# Patient Record
Sex: MALE | Race: WHITE | NOT HISPANIC OR LATINO | Employment: OTHER | URBAN - METROPOLITAN AREA
[De-identification: names, ages, dates, MRNs, and addresses within clinical notes are randomized per-mention and may not be internally consistent; named-entity substitution may affect disease eponyms.]

---

## 2019-06-10 ENCOUNTER — TELEPHONE (OUTPATIENT)
Dept: INTERNAL MEDICINE | Facility: CLINIC | Age: 64
End: 2019-06-10

## 2019-06-10 NOTE — TELEPHONE ENCOUNTER
----- Message from Estela Hughes sent at 6/10/2019 11:09 AM CDT -----  Contact: self  Pt has new pt appt scheduled for tomorrow, 06/11. Pt would like to have lab work done and would like to know if he needs to fast. Please call pt back at [phone].      Thanks,   Estela Hughes

## 2019-06-11 ENCOUNTER — HOSPITAL ENCOUNTER (OUTPATIENT)
Dept: RADIOLOGY | Facility: HOSPITAL | Age: 64
Discharge: HOME | End: 2019-06-11
Attending: FAMILY MEDICINE
Payer: MEDICARE

## 2019-06-11 ENCOUNTER — OFFICE VISIT (OUTPATIENT)
Dept: INTERNAL MEDICINE | Facility: CLINIC | Age: 64
End: 2019-06-11
Payer: MEDICARE

## 2019-06-11 VITALS
SYSTOLIC BLOOD PRESSURE: 114 MMHG | WEIGHT: 143.06 LBS | DIASTOLIC BLOOD PRESSURE: 66 MMHG | HEIGHT: 65 IN | BODY MASS INDEX: 23.83 KG/M2 | TEMPERATURE: 97 F | HEART RATE: 57 BPM | OXYGEN SATURATION: 98 %

## 2019-06-11 DIAGNOSIS — A60.00 HERPES SIMPLEX INFECTION OF GENITOURINARY SYSTEM: ICD-10-CM

## 2019-06-11 DIAGNOSIS — J41.0 SIMPLE CHRONIC BRONCHITIS: ICD-10-CM

## 2019-06-11 DIAGNOSIS — J41.0 SIMPLE CHRONIC BRONCHITIS: Primary | Chronic | ICD-10-CM

## 2019-06-11 DIAGNOSIS — L30.9 DERMATITIS: Chronic | ICD-10-CM

## 2019-06-11 DIAGNOSIS — Z11.4 SCREENING FOR HIV WITHOUT PRESENCE OF RISK FACTORS: ICD-10-CM

## 2019-06-11 DIAGNOSIS — Z11.3 ROUTINE SCREENING FOR STI (SEXUALLY TRANSMITTED INFECTION): ICD-10-CM

## 2019-06-11 DIAGNOSIS — R07.89 OTHER CHEST PAIN: ICD-10-CM

## 2019-06-11 DIAGNOSIS — Z13.1 SCREENING FOR DIABETES MELLITUS: ICD-10-CM

## 2019-06-11 DIAGNOSIS — R21 RASH AND OTHER NONSPECIFIC SKIN ERUPTION: ICD-10-CM

## 2019-06-11 DIAGNOSIS — Z12.5 SCREENING PSA (PROSTATE SPECIFIC ANTIGEN): ICD-10-CM

## 2019-06-11 DIAGNOSIS — Z13.220 SCREENING FOR LIPID DISORDERS: ICD-10-CM

## 2019-06-11 PROCEDURE — 3008F PR BODY MASS INDEX (BMI) DOCUMENTED: ICD-10-PCS | Mod: CPTII,S$GLB,, | Performed by: FAMILY MEDICINE

## 2019-06-11 PROCEDURE — 99999 PR PBB SHADOW E&M-EST. PATIENT-LVL IV: CPT | Mod: PBBFAC,,, | Performed by: FAMILY MEDICINE

## 2019-06-11 PROCEDURE — 99203 OFFICE O/P NEW LOW 30 MIN: CPT | Mod: S$GLB,,, | Performed by: FAMILY MEDICINE

## 2019-06-11 PROCEDURE — 99499 UNLISTED E&M SERVICE: CPT | Mod: S$GLB,,, | Performed by: FAMILY MEDICINE

## 2019-06-11 PROCEDURE — 71046 XR CHEST PA AND LATERAL: ICD-10-PCS | Mod: 26,,, | Performed by: RADIOLOGY

## 2019-06-11 PROCEDURE — 71046 X-RAY EXAM CHEST 2 VIEWS: CPT | Mod: TC

## 2019-06-11 PROCEDURE — 71046 X-RAY EXAM CHEST 2 VIEWS: CPT | Mod: 26,,, | Performed by: RADIOLOGY

## 2019-06-11 PROCEDURE — 1101F PR PT FALLS ASSESS DOC 0-1 FALLS W/OUT INJ PAST YR: ICD-10-PCS | Mod: CPTII,S$GLB,, | Performed by: FAMILY MEDICINE

## 2019-06-11 PROCEDURE — 99999 PR PBB SHADOW E&M-EST. PATIENT-LVL IV: ICD-10-PCS | Mod: PBBFAC,,, | Performed by: FAMILY MEDICINE

## 2019-06-11 PROCEDURE — 99499 RISK ADDL DX/OHS AUDIT: ICD-10-PCS | Mod: S$GLB,,, | Performed by: FAMILY MEDICINE

## 2019-06-11 PROCEDURE — 3008F BODY MASS INDEX DOCD: CPT | Mod: CPTII,S$GLB,, | Performed by: FAMILY MEDICINE

## 2019-06-11 PROCEDURE — 99203 PR OFFICE/OUTPT VISIT, NEW, LEVL III, 30-44 MIN: ICD-10-PCS | Mod: S$GLB,,, | Performed by: FAMILY MEDICINE

## 2019-06-11 PROCEDURE — 1101F PT FALLS ASSESS-DOCD LE1/YR: CPT | Mod: CPTII,S$GLB,, | Performed by: FAMILY MEDICINE

## 2019-06-11 RX ORDER — ACYCLOVIR 400 MG/1
400 TABLET ORAL 2 TIMES DAILY
Qty: 180 TABLET | Refills: 3 | Status: SHIPPED | OUTPATIENT
Start: 2019-06-11 | End: 2020-09-04 | Stop reason: SDUPTHER

## 2019-06-11 RX ORDER — ACYCLOVIR 400 MG/1
TABLET ORAL
COMMUNITY
Start: 2019-05-02 | End: 2019-06-11 | Stop reason: SDUPTHER

## 2019-06-11 NOTE — PROGRESS NOTES
CHIEF COMPLAINT  Establish Care and Cough    This is my first time treating him here. All problems addressed today are NEW TO ME.  He is requesting that I take over as his primary care provider.     HISTORY, ASSESSMENT, and PLAN    PROBLEM/CONDITION: Simple chronic bronchitis        ASSESSMENT:  He describes chronic recurrent productive cough for greater than a year.    PROBLEM/CONDITION: Herpes simplex infection of genitourinary system        ASSESSMENT:  Suppressed with antiviral therapy.  He request refill.    PROBLEM/CONDITION: Dermatitis of scalp        ASSESSMENT:  He has chronic pruritic scaly rash of scalp, which is suggestive of seborrheic dermatitis.  However, he feels that it is psoriasis.  It was agreed that he would see Dermatology for second opinion and further evaluation and treatment.    PROBLEM/CONDITION: Other chest pain         ASSESSMENT:  He reports occasional chest pain with cough, but no exertional chest pain or dyspnea on exertion.    PROBLEM/CONDITION: Screening for lipid disorders        ASSESSMENT: Ordered.    PROBLEM/CONDITION: Screening for diabetes mellitus        ASSESSMENT: Ordered.    PROBLEM/CONDITION: Screening PSA (prostate specific antigen)        ASSESSMENT: Ordered.    PROBLEM/CONDITION: Screening for HIV without presence of risk factors        ASSESSMENT: Ordered.    PROBLEM/CONDITION: Routine screening for STI (sexually transmitted infection)        ASSESSMENT: Ordered.      Orders Placed This Encounter    C. trachomatis/N. gonorrhoeae by AMP DNA    X-Ray Chest PA And Lateral    Lipid panel    PSA, Screening    Glucose, random    HIV 1/2 Ag/Ab (4th Gen)    RPR    Ambulatory Referral to Dermatology    Ambulatory referral to Pulmonology    acyclovir (ZOVIRAX) 400 MG tablet       Problem List Items Addressed This Visit        Derm    Dermatitis of scalp (Chronic)    Relevant Orders    Ambulatory Referral to Dermatology       Pulmonary    Simple chronic bronchitis -  Primary (Chronic)    Current Assessment & Plan     Sometimes pain with cough.         Relevant Orders    Ambulatory referral to Pulmonology    X-Ray Chest PA And Lateral (Completed)       ID    Herpes simplex infection of genitourinary system    Current Assessment & Plan     Well controlled on acyclovir 400mg BID.         Relevant Medications    acyclovir (ZOVIRAX) 400 MG tablet      Other Visit Diagnoses     Screening for lipid disorders        Relevant Orders    Lipid panel (Completed)    Screening for diabetes mellitus        Relevant Orders    Glucose, random (Completed)    Screening PSA (prostate specific antigen)        Relevant Orders    PSA, Screening (Completed)    Screening for HIV without presence of risk factors        Relevant Orders    HIV 1/2 Ag/Ab (4th Gen) (Completed)    Routine screening for STI (sexually transmitted infection)        Relevant Orders    Lipid panel (Completed)    PSA, Screening (Completed)    Glucose, random (Completed)    HIV 1/2 Ag/Ab (4th Gen) (Completed)    RPR (Completed)    C. trachomatis/N. gonorrhoeae by AMP DNA (Completed)    Other chest pain         Relevant Orders    X-Ray Chest PA And Lateral (Completed)    Rash and other nonspecific skin eruption         Relevant Orders    C. trachomatis/N. gonorrhoeae by AMP DNA (Completed)           Outpatient Medications Prior to Visit   Medication Sig Dispense Refill    acyclovir (ZOVIRAX) 400 MG tablet        No facility-administered medications prior to visit.         Medications Ordered This Encounter   Medications    acyclovir (ZOVIRAX) 400 MG tablet     Sig: Take 1 tablet (400 mg total) by mouth 2 (two) times daily.     Dispense:  180 tablet     Refill:  3       Medications Discontinued During This Encounter   Medication Reason    acyclovir (ZOVIRAX) 400 MG tablet Reorder        Follow up in about 2 months (around 8/11/2019) for re-evaluate problem(s) discussed today.    REVIEW OF SYSTEMS  CONSTITUTIONAL: No fever  "reported.  CARDIOVASCULAR: No angina reported.  PULMONARY: No hemoptysis reported.  PSYCHIATRIC: No mood instability reported.  NEUROLOGIC: No seizures reported.  ENDOCRINE: No polydipsia reported.  GASTROINTESTINAL: No blood in stool reported.  GENITOURINARY: No hematuria reported.  ENT: No acute hearing changes reported.  OPHTHALMOLOGIC: No acute vision changes reported.  HEMATOLOGIC: No bleeding problems reported.  MUSCULOSKELETAL: No recent injuries reported.  DERMATOLOGIC: No skin infection reported.  REMAINDER OF COMPLETE REVIEW OF SYSTEMS is negative or noncontributory to present illness except as noted herein.     PHYSICAL EXAM  Vitals:    06/11/19 0933   BP: 114/66   BP Location: Right arm   Patient Position: Sitting   Pulse: (!) 57   Temp: 97.2 °F (36.2 °C)   TempSrc: Tympanic   SpO2: 98%   Weight: 64.9 kg (143 lb 1.3 oz)   Height: 5' 5" (1.651 m)     CONSTITUTIONAL: Vital signs noted. No apparent distress. Does not appear acutely ill or septic. Appears adequately hydrated.  EYE: Sclerae anicteric. Lids and conjunctiva unremarkable.  ENT: External ENT unremarkable. Oropharynx moist.  NECK: Trachea midline. Thyroid nontender.  PULM: Lungs clear. Breathing unlabored.  CV: Auscultation reveals regular rate and rhythm without murmur, gallop or rub. No carotid bruit.  GI: Soft and nontender. Bowel sounds normal.  DERM: Skin warm and moist with normal turgor.  NEURO: There are no gross focal motor deficits or gross deficits of cranial nerves III-XII.  PSYCH: Alert and oriented x 3. Mood is grossly neutral. Affect appropriate. Judgment and insight not grossly compromised.  MSK: Grossly normal stance and gait.     PAST MEDICAL HISTORY  He has a past medical history of Allergy, Dermatitis of scalp, Herpes simplex, and Simple chronic bronchitis.    SURGICAL HISTORY  He has a past surgical history that includes Tonsillectomy.    FAMILY HISTORY  His family history includes Alzheimer's disease in his mother; Arthritis " "in his father; Cancer in his mother and sister; Diabetes in his father; Heart disease in his father; Stroke in his father.     ALLERGIES  Review of patient's allergies indicates:   Allergen Reactions    Bee sting [allergen ext-venom-honey bee]     Clindamycin hcl Other (See Comments)    Penicillin g potassium Other (See Comments)    Tetracyclines Other (See Comments)       SOCIAL HISTORY   TOBACCO USE HISTORY: He reports that he has never smoked. He has never used smokeless tobacco.   ALCOHOL USE HISTORY:  He reports that he drank alcohol.   RECREATIONAL DRUG USE HISTORY:  He reports that he does not use drugs.    Documentation entered by me for this encounter may have been done in part using speech-recognition technology. Although I have made an effort to ensure accuracy, "sound like" errors may exist and should be interpreted in context. -MARISELA Marie MD.    "

## 2019-06-12 ENCOUNTER — TELEPHONE (OUTPATIENT)
Dept: FAMILY MEDICINE | Facility: CLINIC | Age: 64
End: 2019-06-12

## 2019-06-12 NOTE — TELEPHONE ENCOUNTER
----- Message from Xi Fitzgerald sent at 6/12/2019 11:45 AM CDT -----  Contact: pt  Type:  Patient Returning Call    Who Called:Patient  Who Left Message for Patient:Yue  Does the patient know what this is regarding? Test results  Would the patient rather a call back or a response via [facility]? Call back  Best Call Back Number:[phone]  Additional Information: please send results to pt e-mail address

## 2019-06-18 ENCOUNTER — PATIENT OUTREACH (OUTPATIENT)
Dept: ADMINISTRATIVE | Facility: HOSPITAL | Age: 64
End: 2019-06-18

## 2019-06-18 NOTE — PROGRESS NOTES
Contacted patient to follow up on overdue fit kit. Left message requesting a call back.     Patient states he didn't receive one at his visit, but he would like one to be mailed to him    FitKit was given to patient on 6/20/2019 2:59 PM

## 2019-06-20 ENCOUNTER — PATIENT OUTREACH (OUTPATIENT)
Dept: ADMINISTRATIVE | Facility: HOSPITAL | Age: 64
End: 2019-06-20

## 2019-06-20 DIAGNOSIS — Z12.11 SCREEN FOR COLON CANCER: Primary | ICD-10-CM

## 2019-06-20 NOTE — PROGRESS NOTES
Health maintenance reviewed. Immunizations reviewed and reconciled.  Fitkit ordered WOG to facilitate Fitkit to be mailed by ROSA Vanegas.

## 2019-06-25 ENCOUNTER — TELEPHONE (OUTPATIENT)
Dept: INTERNAL MEDICINE | Facility: CLINIC | Age: 64
End: 2019-06-25

## 2019-06-25 NOTE — TELEPHONE ENCOUNTER
----- Message from Gerda Gimenez sent at 6/25/2019 10:56 AM CDT -----  Contact: Pt  Please give pt a call at .[phone] (home) regarding some severe sinus hussain he is having. Pt needs to have something called in for his sinus issues he doesn't see the pulmonologist until August.       ..  [facility] [employer] [facility] - [place] - [address] AT [facility]  [address] [address]  Phone: [phone] Fax: [phone]

## 2019-06-25 NOTE — TELEPHONE ENCOUNTER
Spoke with patient and he stated that he is still having the post nasal drip and cough along with sinus pressure that he spoke to Dr. Marie about on 6/11/19. He is now requesting something to clear up the mucous.

## 2019-06-27 ENCOUNTER — TELEPHONE (OUTPATIENT)
Dept: INTERNAL MEDICINE | Facility: CLINIC | Age: 64
End: 2019-06-27

## 2019-06-27 NOTE — TELEPHONE ENCOUNTER
Called and left a voicemail for patient that I have not gotten a response from Dr. Marie yet and am still waiting.

## 2019-06-27 NOTE — TELEPHONE ENCOUNTER
----- Message from Keegan Barone sent at 6/27/2019  9:39 AM CDT -----  Contact: [phone]  ..Type:  Patient Returning Call    Who Called:Chuy  Who Left Message for Patient:Rick  Does the patient know what this is regarding?:missed call 6/25  Would the patient rather a call back or a response via [facility]? Call back   Best Call Back Number:[phone]  Additional Information:

## 2019-07-01 ENCOUNTER — APPOINTMENT (OUTPATIENT)
Dept: LAB | Facility: HOSPITAL | Age: 64
End: 2019-07-01
Attending: FAMILY MEDICINE
Payer: MEDICARE

## 2019-07-21 NOTE — TELEPHONE ENCOUNTER
Will discuss further at next appointment.  Future Appointments   Date Time Provider Department Center   8/5/2019  8:40 AM Wil [facility]urn MD [facility] [place]   8/12/2019  8:20 AM MARISELA Marie MD [facility] [place]

## 2019-07-29 ENCOUNTER — TELEPHONE (OUTPATIENT)
Dept: INTERNAL MEDICINE | Facility: CLINIC | Age: 64
End: 2019-07-29

## 2019-07-29 NOTE — TELEPHONE ENCOUNTER
----- Message from Sandy Ross sent at 7/29/2019 12:49 PM CDT -----  Contact: self [phone]  Type:  Test Results    Who Called: Chuy Bucio  Name of Test (Lab/Mammo/Etc): Stool  Date of Test: 07/01/19  Ordering Provider: Frank  Where the test was performed: [facility]  Would the patient rather a call back or a response via [facility]? Call back   Best Call Back Number: [phone]  Additional Information:

## 2019-07-29 NOTE — TELEPHONE ENCOUNTER
Called and left a voicemail for patient to return my call regarding his questions about  lab tests.

## 2019-08-01 ENCOUNTER — TELEPHONE (OUTPATIENT)
Dept: PULMONOLOGY | Facility: CLINIC | Age: 64
End: 2019-08-01

## 2019-08-01 DIAGNOSIS — R06.02 SOB (SHORTNESS OF BREATH): Primary | ICD-10-CM

## 2019-08-05 ENCOUNTER — CLINICAL SUPPORT (OUTPATIENT)
Dept: PULMONOLOGY | Facility: CLINIC | Age: 64
End: 2019-08-05
Payer: MEDICARE

## 2019-08-05 ENCOUNTER — PATIENT OUTREACH (OUTPATIENT)
Dept: ADMINISTRATIVE | Facility: HOSPITAL | Age: 64
End: 2019-08-05

## 2019-08-05 ENCOUNTER — OFFICE VISIT (OUTPATIENT)
Dept: PULMONOLOGY | Facility: CLINIC | Age: 64
End: 2019-08-05
Payer: MEDICARE

## 2019-08-05 ENCOUNTER — HOSPITAL ENCOUNTER (OUTPATIENT)
Dept: RADIOLOGY | Facility: HOSPITAL | Age: 64
Discharge: HOME | End: 2019-08-05
Attending: INTERNAL MEDICINE
Payer: MEDICARE

## 2019-08-05 VITALS
RESPIRATION RATE: 16 BRPM | BODY MASS INDEX: 23.88 KG/M2 | HEART RATE: 67 BPM | DIASTOLIC BLOOD PRESSURE: 68 MMHG | WEIGHT: 143.31 LBS | OXYGEN SATURATION: 99 % | SYSTOLIC BLOOD PRESSURE: 110 MMHG | HEIGHT: 65 IN

## 2019-08-05 DIAGNOSIS — R05.8 UPPER AIRWAY COUGH SYNDROME: Primary | ICD-10-CM

## 2019-08-05 DIAGNOSIS — R05.8 UPPER AIRWAY COUGH SYNDROME: ICD-10-CM

## 2019-08-05 DIAGNOSIS — Z11.59 NEED FOR HEPATITIS C SCREENING TEST: Primary | ICD-10-CM

## 2019-08-05 DIAGNOSIS — R05.3 CHRONIC COUGH: ICD-10-CM

## 2019-08-05 LAB
BRPFT: NORMAL
FEF 25 75 CHG: 17.1 %
FEF 25 75 LLN: 1.08
FEF 25 75 POST REF: 91.4 %
FEF 25 75 PRE REF: 78.1 %
FEF 25 75 REF: 2.35
FET100 CHG: 8.8 %
FEV1 CHG: 7.1 %
FEV1 FVC CHG: 2.1 %
FEV1 FVC LLN: 65
FEV1 FVC POST REF: 90.3 %
FEV1 FVC PRE REF: 88.4 %
FEV1 FVC REF: 78
FEV1 LLN: 2.11
FEV1 POST REF: 107.9 %
FEV1 PRE REF: 100.7 %
FEV1 REF: 2.86
FVC CHG: 4.9 %
FVC LLN: 2.77
FVC POST REF: 119.4 %
FVC PRE REF: 113.9 %
FVC REF: 3.69
PEF CHG: 22.1 %
PEF LLN: 5.73
PEF POST REF: 93.8 %
PEF PRE REF: 76.8 %
PEF REF: 7.73
POST FEF 25 75: 2.15 L/S (ref 1.08–3.62)
POST FET 100: 11.51 SEC
POST FEV1 FVC: 70.01 % (ref 64.6–90.46)
POST FEV1: 3.08 L (ref 2.11–3.61)
POST FVC: 4.41 L (ref 2.77–4.61)
POST PEF: 7.25 L/S (ref 5.73–9.72)
PRE FEF 25 75: 1.83 L/S (ref 1.08–3.62)
PRE FET 100: 10.58 SEC
PRE FEV1 FVC: 68.56 % (ref 64.6–90.46)
PRE FEV1: 2.88 L (ref 2.11–3.61)
PRE FVC: 4.2 L (ref 2.77–4.61)
PRE PEF: 5.93 L/S (ref 5.73–9.72)

## 2019-08-05 PROCEDURE — 94060 EVALUATION OF WHEEZING: CPT | Mod: S$GLB,,, | Performed by: INTERNAL MEDICINE

## 2019-08-05 PROCEDURE — 70220 X-RAY EXAM OF SINUSES: CPT | Mod: TC

## 2019-08-05 PROCEDURE — 94060 PR EVAL OF BRONCHOSPASM: ICD-10-PCS | Mod: S$GLB,,, | Performed by: INTERNAL MEDICINE

## 2019-08-05 PROCEDURE — 3008F PR BODY MASS INDEX (BMI) DOCUMENTED: ICD-10-PCS | Mod: CPTII,S$GLB,, | Performed by: INTERNAL MEDICINE

## 2019-08-05 PROCEDURE — 99999 PR PBB SHADOW E&M-EST. PATIENT-LVL III: ICD-10-PCS | Mod: PBBFAC,,, | Performed by: INTERNAL MEDICINE

## 2019-08-05 PROCEDURE — 99999 PR PBB SHADOW E&M-EST. PATIENT-LVL III: CPT | Mod: PBBFAC,,, | Performed by: INTERNAL MEDICINE

## 2019-08-05 PROCEDURE — 99214 OFFICE O/P EST MOD 30 MIN: CPT | Mod: 25,S$GLB,, | Performed by: INTERNAL MEDICINE

## 2019-08-05 PROCEDURE — 3008F BODY MASS INDEX DOCD: CPT | Mod: CPTII,S$GLB,, | Performed by: INTERNAL MEDICINE

## 2019-08-05 PROCEDURE — 99214 PR OFFICE/OUTPT VISIT, EST, LEVL IV, 30-39 MIN: ICD-10-PCS | Mod: 25,S$GLB,, | Performed by: INTERNAL MEDICINE

## 2019-08-05 PROCEDURE — 1101F PR PT FALLS ASSESS DOC 0-1 FALLS W/OUT INJ PAST YR: ICD-10-PCS | Mod: CPTII,S$GLB,, | Performed by: INTERNAL MEDICINE

## 2019-08-05 PROCEDURE — 70220 XR SINUSES MIN 3 VIEWS: ICD-10-PCS | Mod: 26,,, | Performed by: RADIOLOGY

## 2019-08-05 PROCEDURE — 70220 X-RAY EXAM OF SINUSES: CPT | Mod: 26,,, | Performed by: RADIOLOGY

## 2019-08-05 PROCEDURE — 1101F PT FALLS ASSESS-DOCD LE1/YR: CPT | Mod: CPTII,S$GLB,, | Performed by: INTERNAL MEDICINE

## 2019-08-05 RX ORDER — FLUTICASONE PROPIONATE 50 MCG
1 SPRAY, SUSPENSION (ML) NASAL DAILY
Qty: 1 BOTTLE | Refills: 0 | Status: SHIPPED | OUTPATIENT
Start: 2019-08-05 | End: 2019-10-24

## 2019-08-05 NOTE — PROGRESS NOTES
Subjective:       Patient ID: Chuy Bucio is a 63 y.o. male.  Patient Active Problem List   Diagnosis    Herpes simplex infection of genitourinary system    Dermatitis of scalp    Simple chronic bronchitis       Chief Complaint:  Chuy Bucio 63 y.o.  Referred to me by Dr. 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  [facility] severity index: 5 ( LOW/ NORMAL)  Recently established care with Dr. Marie.  Has had intermittent cough that occurs when he is  exposed to cold air.  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  Has had recurrent bronchitis was seen in June.  He has been treated with Z-Angel.  Years ago he saw Dr. Del Real for similar symptoms and an inhaler was given which he used for 3 weeks on the not feel better.  He denies any past no history of asthma.  Occasional Sinus drip, never seen ENT  Never smoker  No hemoptysis no wheezing.  He feels that he has a chronic congestion and pressure at the back of his down arm.  No other like occupational exposures.  Some of his symptoms seem to be   exacerbated once when he is in extreme exercise like bike riding.    Occupational History:  denies occupational exposure to asbestos, silica and petrochemicals.    Avocational Exposures:  pollens    Pet Exposures:  none      The following portions of the patient's history were reviewed and updated as appropriate:   He  has a past medical history of Allergy, Dermatitis of scalp (6/11/2019), Herpes simplex, and Simple chronic bronchitis (6/11/2019).  He does not have any pertinent problems on file.  He  has a past surgical history that includes Tonsillectomy.  His family history includes Alzheimer's disease in his mother; Arthritis in his father; Cancer in his mother and sister; Diabetes in his father; Heart disease in his father; Stroke in his father.  He  reports that he has never smoked. He has never used  "smokeless tobacco. He reports that he drank alcohol. He reports that he does not use drugs.  He has a current medication list which includes the following prescription(s): acyclovir and fluticasone propionate.  Current Outpatient Medications on File Prior to Visit   Medication Sig Dispense Refill    acyclovir (ZOVIRAX) 400 MG tablet Take 1 tablet (400 mg total) by mouth 2 (two) times daily. 180 tablet 3     No current facility-administered medications on file prior to visit.      He is allergic to bee sting [allergen ext-venom-honey bee]; clindamycin hcl; penicillin g potassium; and tetracyclines..    Review of Systems  A comprehensive review of systems was negative except for: Respiratory: positive for cough    Denies wheezing denies hemoptysis denies night sweats denies weight loss denies occupational exposures to asbestos mold     Objective:     Vitals:    08/05/19 0845   BP: 110/68   Pulse: 67   Resp: 16   SpO2: 99%   Weight: 65 kg (143 lb 4.8 oz)   Height: 5' 5" (1.651 m)     Physical Exam   Constitutional: He is oriented to person, place, and time. He appears well-developed and well-nourished.   HENT:   Head: Normocephalic and atraumatic.   Nose: Mucosal edema and rhinorrhea present.   Eyes: Pupils are equal, round, and reactive to light. Conjunctivae and EOM are normal. Left eye exhibits no discharge. No scleral icterus.   Neck: Normal range of motion. Neck supple.   Cardiovascular: Normal rate, regular rhythm and normal heart sounds.   Pulmonary/Chest: Effort normal and breath sounds normal. No stridor. No respiratory distress. He has no wheezes. He has no rales.   Abdominal: Soft. Bowel sounds are normal. He exhibits no distension.   Musculoskeletal: Normal range of motion. He exhibits no edema or deformity.   Neurological: He is alert and oriented to person, place, and time. No cranial nerve deficit.   Skin: Skin is warm and dry. Capillary refill takes 2 to 3 seconds.   Psychiatric: He has a normal mood " and affect.   Nursing note and vitals reviewed.        Data Review:   CBC: No results found for: WBC, RBC, HGB, HCT, PLT  BMP:   Lab Results   Component Value Date    GLU 90 06/11/2019     Radiology review:   Diagnostics: Chest x-ray:  Chest x-ray reviewed with patient shows hyperinflation       Lab Results   Component Value Date    PREFVC 4.20 08/05/2019    NRFVQT3889 1.83 08/05/2019    PREPEF 5.93 08/05/2019    ASIPSF563 10.58 08/05/2019    [id number] 68.56 08/05/2019    POSTFVC 4.41 08/05/2019    POSTFEV1 3.08 08/05/2019    [id number] 2.15 08/05/2019    POSTPEF 7.25 08/05/2019    [id number] 11.51 08/05/2019    OMNGGQC2FWV 70.01 08/05/2019       Flow volume loops are normal.  Normal spirometry. (FEV1/VC greater than or equal to LLN and FVC greater than or equal to LLN)  Normal airflow. (FEV1/VC greater than or equal to LLN)  (Physician Final: 08/05/2019 12:23PM, Electronically signed by Dr. TANA Coburn)    Assessment:      Problem List Items Addressed This Visit     None      Visit Diagnoses     Upper airway cough syndrome    -  Primary    Relevant Medications    fluticasone propionate (FLONASE) 50 mcg/actuation nasal spray    Other Relevant Orders    Spirometry with/without bronchodilator (Completed)    X-Ray Sinuses 3 or more views (Completed)    CBC auto differential    Complete PFT without bronchodilator - Clinic    IGE    MAXIMAL INSPIRATORY/EXPIRATORY PRESSURE    Chronic cough             Plan:        Orders Placed This Encounter   Procedures    X-Ray Sinuses 3 or more views     Standing Status:   Future     Number of Occurrences:   1     Standing Expiration Date:   8/5/2020     Order Specific Question:   May the Radiologist modify the order per protocol to meet the clinical needs of the patient?     Answer:   Yes    CBC auto differential     Standing Status:   Future     Number of Occurrences:   1     Standing Expiration Date:   10/3/2020    IGE     Standing Status:   Future     Number of Occurrences:    1     Standing Expiration Date:   10/3/2020    Spirometry with/without bronchodilator     Standing Status:   Future     Number of Occurrences:   1     Standing Expiration Date:   8/4/2020    Complete PFT without bronchodilator - Clinic     Standing Status:   Future     Standing Expiration Date:   8/5/2020    MAXIMAL INSPIRATORY/EXPIRATORY PRESSURE     Standing Status:   Future     Standing Expiration Date:   8/5/2020     Looks like a cough variant type asthma  Instruction on bronchodilator  If complete PFTs are normal then methacholine challenge test will be performed  Check at IgE    Follow up in about 4 weeks (around 9/2/2019), or Donovan today, Labs, Sinus films today, Flonase. PFT and MEP.MIP next visit, Cough severity index, for Sign up [facility].    This note was prepared using voice recognition system and is likely to have sound alike errors that may have been overlooked even after proof reading.  Please call me with any questions    Discussed diagnosis, its evaluation, treatment and usual course. All questions answered.    Thank you for the courtesy of participating in the care of this patient    Wil Coburn MD

## 2019-08-05 NOTE — PROGRESS NOTES
Health maintenance reviewed.  Care Everywhere checked. Immunizations reviewed and reconciled.  Hep C. Antibody ordered.

## 2019-08-12 ENCOUNTER — LAB VISIT (OUTPATIENT)
Dept: LAB | Facility: HOSPITAL | Age: 64
End: 2019-08-12
Payer: MEDICARE

## 2019-08-12 ENCOUNTER — OFFICE VISIT (OUTPATIENT)
Dept: INTERNAL MEDICINE | Facility: CLINIC | Age: 64
End: 2019-08-12
Payer: MEDICARE

## 2019-08-12 VITALS
DIASTOLIC BLOOD PRESSURE: 66 MMHG | HEIGHT: 65 IN | TEMPERATURE: 96 F | OXYGEN SATURATION: 98 % | BODY MASS INDEX: 23.91 KG/M2 | WEIGHT: 143.5 LBS | SYSTOLIC BLOOD PRESSURE: 108 MMHG | HEART RATE: 58 BPM

## 2019-08-12 DIAGNOSIS — J41.0 SIMPLE CHRONIC BRONCHITIS: Primary | Chronic | ICD-10-CM

## 2019-08-12 DIAGNOSIS — Z23 NEED FOR SHINGLES VACCINE: ICD-10-CM

## 2019-08-12 DIAGNOSIS — Z11.59 NEED FOR HEPATITIS C SCREENING TEST: ICD-10-CM

## 2019-08-12 DIAGNOSIS — Z23 NEED FOR VACCINATION WITH 13-POLYVALENT PNEUMOCOCCAL CONJUGATE VACCINE: ICD-10-CM

## 2019-08-12 PROCEDURE — 1101F PT FALLS ASSESS-DOCD LE1/YR: CPT | Mod: CPTII,S$GLB,, | Performed by: FAMILY MEDICINE

## 2019-08-12 PROCEDURE — 1101F PR PT FALLS ASSESS DOC 0-1 FALLS W/OUT INJ PAST YR: ICD-10-PCS | Mod: CPTII,S$GLB,, | Performed by: FAMILY MEDICINE

## 2019-08-12 PROCEDURE — 99213 OFFICE O/P EST LOW 20 MIN: CPT | Mod: 25,S$GLB,, | Performed by: FAMILY MEDICINE

## 2019-08-12 PROCEDURE — 99999 PR PBB SHADOW E&M-EST. PATIENT-LVL III: CPT | Mod: PBBFAC,,, | Performed by: FAMILY MEDICINE

## 2019-08-12 PROCEDURE — G0009 ADMIN PNEUMOCOCCAL VACCINE: HCPCS | Mod: 59,S$GLB,, | Performed by: FAMILY MEDICINE

## 2019-08-12 PROCEDURE — 90670 PCV13 VACCINE IM: CPT | Mod: S$GLB,,, | Performed by: FAMILY MEDICINE

## 2019-08-12 PROCEDURE — G0009 PNEUMOCOCCAL CONJUGATE VACCINE 13-VALENT LESS THAN 5YO & GREATER THAN: ICD-10-PCS | Mod: 59,S$GLB,, | Performed by: FAMILY MEDICINE

## 2019-08-12 PROCEDURE — 36415 COLL VENOUS BLD VENIPUNCTURE: CPT

## 2019-08-12 PROCEDURE — 99999 PR PBB SHADOW E&M-EST. PATIENT-LVL III: ICD-10-PCS | Mod: PBBFAC,,, | Performed by: FAMILY MEDICINE

## 2019-08-12 PROCEDURE — 3008F PR BODY MASS INDEX (BMI) DOCUMENTED: ICD-10-PCS | Mod: CPTII,S$GLB,, | Performed by: FAMILY MEDICINE

## 2019-08-12 PROCEDURE — 3008F BODY MASS INDEX DOCD: CPT | Mod: CPTII,S$GLB,, | Performed by: FAMILY MEDICINE

## 2019-08-12 PROCEDURE — 86803 HEPATITIS C AB TEST: CPT

## 2019-08-12 PROCEDURE — 90670 PNEUMOCOCCAL CONJUGATE VACCINE 13-VALENT LESS THAN 5YO & GREATER THAN: ICD-10-PCS | Mod: S$GLB,,, | Performed by: FAMILY MEDICINE

## 2019-08-12 PROCEDURE — 99213 PR OFFICE/OUTPT VISIT, EST, LEVL III, 20-29 MIN: ICD-10-PCS | Mod: 25,S$GLB,, | Performed by: FAMILY MEDICINE

## 2019-08-12 NOTE — PROGRESS NOTES
CHIEF COMPLAINT  Follow-up      HISTORY, ASSESSMENT, and PLAN    1.  Simple chronic bronchitis        ASSESSMENT:  Stable.  He saw pulmonologist in was prescribed Flonase.  He has been on this medicine about a week and can tell no significant difference.   He is to follow up with pulmonologist in about three weeks.    2.  Need for vaccination with 13-polyvalent pneumococcal conjugate vaccine        ASSESSMENT:  Ordered.    3.  Need for shingles vaccine       ASSESSMENT:  Ordered.      Orders Placed This Encounter    Pneumococcal Conjugate Vaccine (13 Valent) (IM)    varicella-zoster gE-AS01B, PF, (SHINGRIX) 50 mcg/0.5 mL injection       Problem List Items Addressed This Visit        Pulmonary    Simple chronic bronchitis - Primary (Chronic)      Other Visit Diagnoses     Need for vaccination with 13-polyvalent pneumococcal conjugate vaccine        Relevant Orders    Pneumococcal Conjugate Vaccine (13 Valent) (IM)    Need for shingles vaccine        Relevant Medications    varicella-zoster gE-AS01B, PF, (SHINGRIX) 50 mcg/0.5 mL injection           Outpatient Medications Prior to Visit   Medication Sig Dispense Refill    acyclovir (ZOVIRAX) 400 MG tablet Take 1 tablet (400 mg total) by mouth 2 (two) times daily. 180 tablet 3    fluticasone propionate (FLONASE) 50 mcg/actuation nasal spray 1 spray (50 mcg total) by Each Nostril route once daily. 1 Bottle 0     No facility-administered medications prior to visit.         Medications Ordered This Encounter   Medications    varicella-zoster gE-AS01B, PF, (SHINGRIX) 50 mcg/0.5 mL injection     Sig: Inject 0.5 mL (one dose) into muscle now; give second dose at least 2 months later     Dispense:  0.5 mL     Refill:  1       There are no discontinued medications.     Follow up in about 1 year (around 8/12/2020) for wellness and preventive services.    REVIEW OF SYSTEMS  PULMONARY: No hemoptysis or trouble breathing reported.     PHYSICAL EXAM  Vitals:    08/12/19 0827  "  BP: 108/66   BP Location: Right arm   Patient Position: Sitting   Pulse: (!) 58   Temp: 96 °F (35.6 °C)   TempSrc: Tympanic   SpO2: 98%   Weight: 65.1 kg (143 lb 8.3 oz)   Height: 5' 5" (1.651 m)     CONSTITUTIONAL: Vital signs noted. No apparent distress. Does not appear acutely ill or septic. Appears adequately hydrated.  ENT: Oropharynx moist.  PULM: Breathing unlabored.  CV: Heart regular.  DERM: Skin normothermic.  PSYCHIATRIC: Alert and conversant. Grossly oriented. Mood is grossly neutral. Affect appropriate. Judgment and insight not grossly compromised.     Documentation entered by me for this encounter may have been done in part using speech-recognition technology. Although I have made an effort to ensure accuracy, "sound like" errors may exist and should be interpreted in context. -MARISELA Marie MD.   "

## 2019-08-13 LAB — HCV AB SERPL QL IA: NEGATIVE

## 2019-08-31 NOTE — PROGRESS NOTES
"Chuy Doherty.      I'm happy to report that these test results are NORMAL or at least ACCEPTABLE.    Want to learn more about your test results and what they mean? It's as simple as 1, 2, 3.     (1) Log in to your [facility] account at [email or web address]     (2) From the "View test results" tab, click on the test you want to know more about.     (3) Click on the "About This Test" link.    We can discuss your test results further at your next appointment.  If you have any questions, be sure to write them down and bring them to your appointment.  If you have any urgent questions in the meantime, please send me a message using [facility], or you can call my office at [phone].    Thanks for letting me care for you, thanks for trusting us with your healthcare needs, and thanks for using [facility].    Sincerely,    MARISELA Marie MD"

## 2019-09-09 ENCOUNTER — TELEPHONE (OUTPATIENT)
Dept: PULMONOLOGY | Facility: HOSPITAL | Age: 64
End: 2019-09-09

## 2019-09-09 DIAGNOSIS — J32.9 SINOBRONCHITIS: ICD-10-CM

## 2019-09-09 DIAGNOSIS — J40 SINOBRONCHITIS: ICD-10-CM

## 2019-09-09 DIAGNOSIS — J41.0 SIMPLE CHRONIC BRONCHITIS: Primary | Chronic | ICD-10-CM

## 2019-09-09 NOTE — TELEPHONE ENCOUNTER
"----- Message from Yeny Thompson LPN sent at 9/9/2019 12:59 PM CDT -----  Patient's LV was 8/5/19 he was prescribed flonase. Patient states this is doing nothing for him, "I may as well be shooting water in my nose".  Patient is requesting something different. Please advise.   "

## 2019-09-13 NOTE — PROGRESS NOTES
"Referring Provider:    Wil Coburn Md  [address] [facility]  [place] [address]  Subjective:   Patient: Chuy Bucio [id number], :1955   Visit date:2019 11:40 AM    Chief Complaint:  Cough (dry x 4 months)    HPI:  Chuy is a 63 y.o. male who I was asked to see in consultation for evaluation of the following issue(s):    Patient first presented to clinic on 19 for evaluation of a dry, chronic cough x 4 mo. He was seen by pulmonologist, Dr. Coburn, w/u with CXR and sinus x-ray were insignificant. Pt reported "an imaging done by pulmonary returned for a pocket of air in my upper airway that is not supposed to be there" and believes he may have asthma. He has f/u with Dr. Coburn in October. Trial with Flonase was w/o any relief. He denied significant allergies. 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 He c/o these episodes with the cough and dysphonia multiple times throughout the year for the past several years, lasts 1-3 mo and "goes away on its own". The only trigger he has found with episode onset is cold air, such as Winter season or if he is sitting under a cold air conditioner. He has never smoked. Reported GERD symptoms only with spicy foods. No other relieving factors. Pt also c/o dizziness and tinnitus. No past or recent audiogram.     Dizziness/Vertigo    Onset:  2 month(s)  Total number of episodes:  occasional  Duration of individual episodes:minutes  Severity: mild  Exacerbating factors: turning head  Prior Medications: nothing  Relieving factors:  remaining motionless  Associated signs and symptoms:  Tinnitus  Quality:   Spinning- Yes   Light headedness- No   Sensation that room is moving but patient is motionless- Yes  Prior history of similar events: Yes    Tinnitus:    Patient presents with tinnitus. Onset of symptoms was gradual starting several years ago ago with unchanged course since that time. Patient describes " the tinnitus as fluctuating located in the bilateral ear. The quality is described as high pitch. The pattern is nonpulsatile with an intensity that is soft. Patient describes his level of annoyance as minimally annoying, intermittently aware. Associated symptoms include hearing loss and dizziness Previous treatments include none.      Review of Systems:  Negative unless checked off.  Gen:  []fever   []fatigue  HENT:  []nosebleeds  []dental problem   Eyes:  []photophobia  []visual disturbance  Resp:  []chest tightness []wheezing  Card:  []chest pain  []leg swelling  GI:  []abdominal pain []blood in stool  :  []dysuria  []hematuria  Musc:  []joint swelling  []gait problem  Skin:  []color change  []pallor  Neuro:  []seizures  []numbness  Hem:  []bruise/bleed easily  Psych:  []hallucinations  []behavioral problems  Allergy/Imm: is allergic to bee sting [allergen ext-venom-honey bee]; clindamycin hcl; penicillin g potassium; and tetracyclines.    His meds, allergies, medical, surgical, social & family histories were reviewed & updated:  -     He has a current medication list which includes the following prescription(s): acyclovir, fluticasone propionate, pantoprazole, and varicella-zoster ge-as01b (pf).  -     He  has a past medical history of Allergy, Dermatitis of scalp (6/11/2019), Herpes simplex, and Simple chronic bronchitis (6/11/2019).   -     He does not have any pertinent problems on file.   -     He  has a past surgical history that includes Tonsillectomy.  -     He  reports that he has never smoked. He has never used smokeless tobacco. He reports that he drank alcohol. He reports that he does not use drugs.  -     His family history includes Alzheimer's disease in his mother; Arthritis in his father; Cancer in his mother and sister; Diabetes in his father; Heart disease in his father; Stroke in his father.  -     He is allergic to bee sting [allergen ext-venom-honey bee]; clindamycin hcl; penicillin g  "potassium; and tetracyclines.    Objective:     Physical Exam:  Vitals:  /72 (BP Location: Right arm, Patient Position: Sitting, BP Method: Medium (Automatic))   Pulse 67   Temp 97.8 °F (36.6 °C) (Oral)   Resp 16   Ht 5' 5" (1.651 m)   Wt 65.6 kg (144 lb 10 oz)   BMI 24.07 kg/m²   General appearance:  Well developed, well nourished    Eyes:  Extraocular motions intact, PERRL    Communication:  no hoarseness, no dysphonia    Ears:  Otoscopy of external auditory canals and tympanic membranes was normal, clinical speech reception thresholds grossly intact, no mass/lesion of auricle.  Nose:  No masses/lesions of external nose, nasal mucosa, septum, and turbinates were within normal limits.  Mouth:  No mass/lesion of lips, teeth, gums, hard/soft palate, tongue, tonsils, or oropharynx. See endoscopic exam    Cardiovascular:  No pedal edema; Radial Pulses +2     Neck & Lymphatics:  No cervical lymphadenopathy, no neck mass/crepitus/ asymmetry, trachea is midline, no thyroid enlargement/tenderness/mass.    Psych: Oriented x3,  Alert with normal mood and affect.     Respiration/Chest:  Symmetric expansion during respiration, normal respiratory effort.    Skin:  Warm and intact. No ulcerations of face, scalp, neck.    XR SINUSES MIN 3 VIEWS    CLINICAL HISTORY:  Cough    TECHNIQUE:  AP, lateral, and Cervantes views of the paranasal sinuses were performed    COMPARISON:  None.    FINDINGS:  No fluid levels are noted.  No mucoperiosteal thickening identified.      Impression       No evidence of sinus opacification     XR CHEST PA AND LATERAL    CLINICAL HISTORY:  Simple chronic bronchitis    TECHNIQUE:  PA and lateral views of the chest were performed.    COMPARISON:  None    FINDINGS:  Cardiac silhouette and mediastinal contours are normal.  Lungs are clear.  Osseous structures are intact.      Impression       No acute cardiopulmonary process.     DH: Negative bilaterally    Assessment & Plan:   Chuy was seen " today for cough.    Diagnoses and all orders for this visit:    Chronic cough    Dizziness    Tinnitus of both ears    Dysphonia    Gastroesophageal reflux disease, esophagitis presence not specified    Other orders  -     pantoprazole (PROTONIX) 40 MG tablet; Take 1 tablet (40 mg total) by mouth once daily.    Laryngoscopic exam insignificant.   Trial with Protonix x 4 weeks  Advised keeping f/u with pulm for possible asthma  If dizzy episodes return, will schedule VNG. Advised audiogram, pt deferred at this time.    We discussed his medical conditions, treatments and plan.  Chuy should return to clinic if any issues arise (symptoms worsen or persist), otherwise we will see him back in the clinic only as needed.    Thank you for allowing me to participate in the care of Chuy.      Amie Duffy PA-C  [facility]   [facility]  [address]  [place] [address]  P: [phone]  F: [phone]      Patient: Chuy Bucio [id number], :1955  Procedure date:2019  Patient's medications, allergies, past medical, surgical, social and family histories were reviewed and updated as appropriate.  Chief Complaint:  Cough (dry x 4 months)    HPI:  Chuy is a 63 y.o. male with the history of present illness as discussed in the clinic note from today.    Procedure: Risks, benefits, and alternatives of the procedure were discussed with the patient, and the patient consented to the fiberoptic examination.  We applied a topical nasal decongestant and analgesic.  After adequate anesthesia was obtained, the flexible fiberoptic scope was passed into each nostril independently.  Each nasal cavity, the entire pharynx (nasopharynx to hypopharynx) and the larynx were visualized. At the end of the examination, the scope was removed. The patient tolerated the procedure well with no complications.     Findings:  -     Laryngeal mucosa is normal  -     Posterior commissure has  mild hypertrophy  -     Lingual tonsils have no hypertrophy  -     Adenoids have no  hypertrophy  -     Right vocal fold: normal mobility     mass/lesion: none  -     Left vocal fold: normal mobility     mass/lesion: none  -     Other findings: none; no obstruction/ nodules    Assessment & Plan:  - see today's clinic note

## 2019-09-16 ENCOUNTER — OFFICE VISIT (OUTPATIENT)
Dept: OTOLARYNGOLOGY | Facility: CLINIC | Age: 64
End: 2019-09-16
Payer: MEDICARE

## 2019-09-16 VITALS
WEIGHT: 144.63 LBS | TEMPERATURE: 98 F | RESPIRATION RATE: 16 BRPM | HEIGHT: 65 IN | HEART RATE: 67 BPM | DIASTOLIC BLOOD PRESSURE: 72 MMHG | BODY MASS INDEX: 24.1 KG/M2 | SYSTOLIC BLOOD PRESSURE: 106 MMHG

## 2019-09-16 DIAGNOSIS — K21.9 GASTROESOPHAGEAL REFLUX DISEASE, ESOPHAGITIS PRESENCE NOT SPECIFIED: ICD-10-CM

## 2019-09-16 DIAGNOSIS — R42 DIZZINESS: ICD-10-CM

## 2019-09-16 DIAGNOSIS — R49.0 DYSPHONIA: ICD-10-CM

## 2019-09-16 DIAGNOSIS — H93.13 TINNITUS OF BOTH EARS: ICD-10-CM

## 2019-09-16 DIAGNOSIS — R05.3 CHRONIC COUGH: Primary | ICD-10-CM

## 2019-09-16 PROCEDURE — 31575 DIAGNOSTIC LARYNGOSCOPY: CPT | Mod: S$GLB,,, | Performed by: PHYSICIAN ASSISTANT

## 2019-09-16 PROCEDURE — 1101F PT FALLS ASSESS-DOCD LE1/YR: CPT | Mod: CPTII,S$GLB,, | Performed by: PHYSICIAN ASSISTANT

## 2019-09-16 PROCEDURE — 99204 PR OFFICE/OUTPT VISIT, NEW, LEVL IV, 45-59 MIN: ICD-10-PCS | Mod: 25,S$GLB,, | Performed by: PHYSICIAN ASSISTANT

## 2019-09-16 PROCEDURE — 3008F BODY MASS INDEX DOCD: CPT | Mod: CPTII,S$GLB,, | Performed by: PHYSICIAN ASSISTANT

## 2019-09-16 PROCEDURE — 3008F PR BODY MASS INDEX (BMI) DOCUMENTED: ICD-10-PCS | Mod: CPTII,S$GLB,, | Performed by: PHYSICIAN ASSISTANT

## 2019-09-16 PROCEDURE — 99499 UNLISTED E&M SERVICE: CPT | Mod: S$GLB,,, | Performed by: PHYSICIAN ASSISTANT

## 2019-09-16 PROCEDURE — 99499 RISK ADDL DX/OHS AUDIT: ICD-10-PCS | Mod: S$GLB,,, | Performed by: PHYSICIAN ASSISTANT

## 2019-09-16 PROCEDURE — 99999 PR PBB SHADOW E&M-EST. PATIENT-LVL III: ICD-10-PCS | Mod: PBBFAC,,, | Performed by: PHYSICIAN ASSISTANT

## 2019-09-16 PROCEDURE — 31575 PR LARYNGOSCOPY, FLEXIBLE; DIAGNOSTIC: ICD-10-PCS | Mod: S$GLB,,, | Performed by: PHYSICIAN ASSISTANT

## 2019-09-16 PROCEDURE — 99999 PR PBB SHADOW E&M-EST. PATIENT-LVL III: CPT | Mod: PBBFAC,,, | Performed by: PHYSICIAN ASSISTANT

## 2019-09-16 PROCEDURE — 99204 OFFICE O/P NEW MOD 45 MIN: CPT | Mod: 25,S$GLB,, | Performed by: PHYSICIAN ASSISTANT

## 2019-09-16 PROCEDURE — 1101F PR PT FALLS ASSESS DOC 0-1 FALLS W/OUT INJ PAST YR: ICD-10-PCS | Mod: CPTII,S$GLB,, | Performed by: PHYSICIAN ASSISTANT

## 2019-09-16 RX ORDER — PANTOPRAZOLE SODIUM 40 MG/1
40 TABLET, DELAYED RELEASE ORAL DAILY
Qty: 30 TABLET | Refills: 11 | Status: SHIPPED | OUTPATIENT
Start: 2019-09-16 | End: 2020-11-11

## 2019-09-16 NOTE — LETTER
September 16, 2019      Wil Coburn MD  [address]  [place] [address]           [facility]  [address]  [place] [address]  Phone: [phone]  Fax: [phone]          Patient: Chuy Bucio   MR Number: [id number]   YOB: 1955   Date of Visit: 9/16/2019       Dear Dr. Wil Coburn:    Thank you for referring Chuy Bucio to me for evaluation. Attached you will find relevant portions of my assessment and plan of care.    If you have questions, please do not hesitate to call me. I look forward to following Chuy Bucio along with you.    Sincerely,    Amie Duffy PA-C    Enclosure  CC:  No Recipients    If you would like to receive this communication electronically, please contact [email or web address] or [phone] to request more information on [employer] Link access.    For providers and/or their staff who would like to refer a patient to [facility], please contact us through our one-stop-shop provider referral line, [facility] , at [phone].    If you feel you have received this communication in error or would no longer like to receive these types of communications, please e-mail [email or web address]

## 2019-10-01 ENCOUNTER — HOSPITAL ENCOUNTER (OUTPATIENT)
Dept: RADIOLOGY | Facility: HOSPITAL | Age: 64
Discharge: HOME | End: 2019-10-01
Attending: INTERNAL MEDICINE
Payer: MEDICARE

## 2019-10-01 ENCOUNTER — CLINICAL SUPPORT (OUTPATIENT)
Dept: PULMONOLOGY | Facility: CLINIC | Age: 64
End: 2019-10-01
Payer: MEDICARE

## 2019-10-01 DIAGNOSIS — R06.02 SOB (SHORTNESS OF BREATH): ICD-10-CM

## 2019-10-01 DIAGNOSIS — R05.8 UPPER AIRWAY COUGH SYNDROME: ICD-10-CM

## 2019-10-01 LAB
BRPFT: ABNORMAL
DLCO ADJ PRE: 25.42 ML/(MIN*MMHG) (ref 17.33–31.18)
DLCO SINGLE BREATH LLN: 17.33
DLCO SINGLE BREATH PRE REF: 106.5 %
DLCO SINGLE BREATH REF: 24.25
DLCOC SBVA LLN: 2.58
DLCOC SBVA PRE REF: 119.7 %
DLCOC SBVA REF: 3.92
DLCOC SINGLE BREATH LLN: 17.33
DLCOC SINGLE BREATH PRE REF: 104.8 %
DLCOC SINGLE BREATH REF: 24.25
DLCOVA LLN: 2.58
DLCOVA PRE REF: 121.7 %
DLCOVA PRE: 4.77 ML/(MIN*MMHG*L) (ref 2.58–5.26)
DLCOVA REF: 3.92
DLVAADJ PRE: 4.7 ML/(MIN*MMHG*L) (ref 2.58–5.26)
ERV LLN: 1
ERV PRE REF: 161.9 %
ERV REF: 1
FEF 25 75 LLN: 1.09
FEF 25 75 PRE REF: 78.5 %
FEF 25 75 REF: 2.37
FEV1 FVC LLN: 64
FEV1 FVC PRE REF: 86.6 %
FEV1 FVC REF: 77
FEV1 LLN: 2.13
FEV1 PRE REF: 102.9 %
FEV1 REF: 2.89
FRCPLETH LLN: 2.39
FRCPLETH PREREF: 119.8 %
FRCPLETH REF: 3.38
FVC LLN: 2.81
FVC PRE REF: 118.7 %
FVC REF: 3.74
IVC PRE: 3.89 L (ref 2.81–4.67)
IVC SINGLE BREATH LLN: 2.81
IVC SINGLE BREATH PRE REF: 104.2 %
IVC SINGLE BREATH REF: 3.74
MEP LLN: 83
MEP PRE REF: 74 %
MEP PRE: 74 CMH2O (ref 83.23–116.78)
MEP REF: 100
MIP LLN: 58
MIP PRE REF: 96 %
MIP PRE: 72 CMH2O (ref 58.23–91.78)
MIP REF: 75
MVV LLN: 94
MVV PRE REF: 98.6 %
MVV REF: 111
PEF LLN: 5.79
PEF PRE REF: 92.4 %
PEF REF: 7.81
PRE DLCO: 25.84 ML/(MIN*MMHG) (ref 17.33–31.18)
PRE ERV: 1.63 L (ref 1–1)
PRE FEF 25 75: 1.86 L/S (ref 1.09–3.65)
PRE FET 100: 12.17 SEC
PRE FEV1 FVC: 67.07 % (ref 64.5–90.38)
PRE FEV1: 2.98 L (ref 2.13–3.66)
PRE FRC PL: 4.05 L
PRE FVC: 4.44 L (ref 2.81–4.67)
PRE MVV: 109 L/MIN (ref 93.94–127.1)
PRE PEF: 7.22 L/S (ref 5.79–9.83)
PRE RV: 2.27 L (ref 1.7–3.05)
PRE TLC: 6.71 L (ref 5.03–7.33)
RAW LLN: 3.06
RAW PRE REF: 66.6 %
RAW PRE: 2.04 CMH2O*S/L (ref 3.06–3.06)
RAW REF: 3.06
RV LLN: 1.7
RV PRE REF: 95.7 %
RV REF: 2.37
RVTLC LLN: 30
RVTLC PRE REF: 86.2 %
RVTLC PRE: 33.87 % (ref 30.33–48.29)
RVTLC REF: 39
TLC LLN: 5.03
TLC PRE REF: 108.5 %
TLC REF: 6.18
VA PRE: 5.41 L (ref 6.03–6.03)
VA SINGLE BREATH LLN: 6.03
VA SINGLE BREATH PRE REF: 89.7 %
VA SINGLE BREATH REF: 6.03
VC LLN: 2.81
VC PRE REF: 118.7 %
VC PRE: 4.44 L (ref 2.81–4.67)
VC REF: 3.74
VTGRAWPRE: 3.87 L

## 2019-10-01 PROCEDURE — 94726 PULM FUNCT TST PLETHYSMOGRAP: ICD-10-PCS | Mod: S$GLB,,, | Performed by: INTERNAL MEDICINE

## 2019-10-01 PROCEDURE — 94799 PR NIF/PIF PULMONARY FUNCTION TEST: ICD-10-PCS | Mod: S$GLB,,, | Performed by: INTERNAL MEDICINE

## 2019-10-01 PROCEDURE — 94010 BREATHING CAPACITY TEST: CPT | Mod: S$GLB,,, | Performed by: INTERNAL MEDICINE

## 2019-10-01 PROCEDURE — 71046 XR CHEST PA AND LATERAL: ICD-10-PCS | Mod: 26,,, | Performed by: RADIOLOGY

## 2019-10-01 PROCEDURE — 94729 PR C02/MEMBANE DIFFUSE CAPACITY: ICD-10-PCS | Mod: S$GLB,,, | Performed by: INTERNAL MEDICINE

## 2019-10-01 PROCEDURE — 94726 PLETHYSMOGRAPHY LUNG VOLUMES: CPT | Mod: S$GLB,,, | Performed by: INTERNAL MEDICINE

## 2019-10-01 PROCEDURE — 71046 X-RAY EXAM CHEST 2 VIEWS: CPT | Mod: 26,,, | Performed by: RADIOLOGY

## 2019-10-01 PROCEDURE — 71046 X-RAY EXAM CHEST 2 VIEWS: CPT | Mod: TC

## 2019-10-01 PROCEDURE — 94799 UNLISTED PULMONARY SVC/PX: CPT | Mod: S$GLB,,, | Performed by: INTERNAL MEDICINE

## 2019-10-01 PROCEDURE — 94729 DIFFUSING CAPACITY: CPT | Mod: S$GLB,,, | Performed by: INTERNAL MEDICINE

## 2019-10-01 PROCEDURE — 94010 BREATHING CAPACITY TEST: ICD-10-PCS | Mod: S$GLB,,, | Performed by: INTERNAL MEDICINE

## 2019-10-03 ENCOUNTER — OFFICE VISIT (OUTPATIENT)
Dept: SLEEP MEDICINE | Facility: CLINIC | Age: 64
End: 2019-10-03
Payer: MEDICARE

## 2019-10-03 VITALS
BODY MASS INDEX: 24.28 KG/M2 | WEIGHT: 145.75 LBS | HEIGHT: 65 IN | SYSTOLIC BLOOD PRESSURE: 102 MMHG | OXYGEN SATURATION: 100 % | DIASTOLIC BLOOD PRESSURE: 68 MMHG | HEART RATE: 68 BPM | RESPIRATION RATE: 18 BRPM

## 2019-10-03 DIAGNOSIS — R05.8 OCCASIONAL COUGH: ICD-10-CM

## 2019-10-03 DIAGNOSIS — J41.0 SIMPLE CHRONIC BRONCHITIS: Primary | Chronic | ICD-10-CM

## 2019-10-03 PROCEDURE — 99214 OFFICE O/P EST MOD 30 MIN: CPT | Mod: S$GLB,,, | Performed by: INTERNAL MEDICINE

## 2019-10-03 PROCEDURE — 99999 PR PBB SHADOW E&M-EST. PATIENT-LVL IV: CPT | Mod: PBBFAC,,, | Performed by: INTERNAL MEDICINE

## 2019-10-03 PROCEDURE — 1101F PR PT FALLS ASSESS DOC 0-1 FALLS W/OUT INJ PAST YR: ICD-10-PCS | Mod: CPTII,S$GLB,, | Performed by: INTERNAL MEDICINE

## 2019-10-03 PROCEDURE — 3008F BODY MASS INDEX DOCD: CPT | Mod: CPTII,S$GLB,, | Performed by: INTERNAL MEDICINE

## 2019-10-03 PROCEDURE — 99499 UNLISTED E&M SERVICE: CPT | Mod: S$GLB,,, | Performed by: INTERNAL MEDICINE

## 2019-10-03 PROCEDURE — 1101F PT FALLS ASSESS-DOCD LE1/YR: CPT | Mod: CPTII,S$GLB,, | Performed by: INTERNAL MEDICINE

## 2019-10-03 PROCEDURE — 99214 PR OFFICE/OUTPT VISIT, EST, LEVL IV, 30-39 MIN: ICD-10-PCS | Mod: S$GLB,,, | Performed by: INTERNAL MEDICINE

## 2019-10-03 PROCEDURE — 99999 PR PBB SHADOW E&M-EST. PATIENT-LVL IV: ICD-10-PCS | Mod: PBBFAC,,, | Performed by: INTERNAL MEDICINE

## 2019-10-03 PROCEDURE — 99499 RISK ADDL DX/OHS AUDIT: ICD-10-PCS | Mod: S$GLB,,, | Performed by: INTERNAL MEDICINE

## 2019-10-03 PROCEDURE — 3008F PR BODY MASS INDEX (BMI) DOCUMENTED: ICD-10-PCS | Mod: CPTII,S$GLB,, | Performed by: INTERNAL MEDICINE

## 2019-10-03 NOTE — PROGRESS NOTES
Subjective:       Patient ID: Chuy Bucio is a 63 y.o. male.  Patient Active Problem List   Diagnosis    Herpes simplex infection of genitourinary system    Dermatitis of scalp    Simple chronic bronchitis    Occasional cough       Chief Complaint:  Chuy Bucio 63 y.o.  Referred to me by Dr. Erica Marie  Here to review CXR, Sinus Xrays, PFT  Studies normal  Cough resolved mostly  Used Flonase for some time then quit  Offered to see allergy  Very minimal QOL impairment  Some of his symptoms seem to be   exacerbated once when he is in extreme exercise like bike riding.    Occupational History:  denies occupational exposure to asbestos, silica and petrochemicals.    Avocational Exposures:  pollens    Pet Exposures:  none      The following portions of the patient's history were reviewed and updated as appropriate:   He  has a past medical history of Allergy, Dermatitis of scalp (6/11/2019), Herpes simplex, and Simple chronic bronchitis (6/11/2019).  He does not have any pertinent problems on file.  He  has a past surgical history that includes Tonsillectomy.  His family history includes Alzheimer's disease in his mother; Arthritis in his father; Cancer in his mother and sister; Diabetes in his father; Heart disease in his father; Stroke in his father.  He  reports that he has never smoked. He has never used smokeless tobacco. He reports that he drank alcohol. He reports that he does not use drugs.  He has a current medication list which includes the following prescription(s): acyclovir, fluad 3766-3108 (65 yr up)(pf), varicella-zoster ge-as01b (pf), fluticasone propionate, and pantoprazole.  Current Outpatient Medications on File Prior to Visit   Medication Sig Dispense Refill    acyclovir (ZOVIRAX) 400 MG tablet Take 1 tablet (400 mg total) by mouth 2 (two) times daily. 180 tablet 3    FLUAD 7403-9119, 65 YR UP,,PF, 45 mcg (15 mcg x 3)/0.5 mL Syrg ADM 0.5ML IM UTD  0    varicella-zoster gE-AS01B,  "PF, (SHINGRIX) 50 mcg/0.5 mL injection Inject 0.5 mL (one dose) into muscle now; give second dose at least 2 months later 0.5 mL 1    fluticasone propionate (FLONASE) 50 mcg/actuation nasal spray 1 spray (50 mcg total) by Each Nostril route once daily. (Patient not taking: Reported on 10/3/2019) 1 Bottle 0    pantoprazole (PROTONIX) 40 MG tablet Take 1 tablet (40 mg total) by mouth once daily. (Patient not taking: Reported on 10/3/2019) 30 tablet 11     No current facility-administered medications on file prior to visit.      He is allergic to bee sting [allergen ext-venom-honey bee]; clindamycin hcl; penicillin g potassium; and tetracyclines..    Review of Systems  A comprehensive review of systems was negative except for: Respiratory: positive for cough    Denies wheezing denies hemoptysis denies night sweats denies weight loss denies occupational exposures to asbestos mold     Objective:     Vitals:    10/03/19 0945   BP: 102/68   Pulse: 68   Resp: 18   SpO2: 100%   Weight: 66.1 kg (145 lb 11.6 oz)   Height: 5' 5" (1.651 m)     Physical Exam   Constitutional: He is oriented to person, place, and time. He appears well-developed and well-nourished.   HENT:   Head: Normocephalic and atraumatic.   Nose: Mucosal edema and rhinorrhea present.   Eyes: Pupils are equal, round, and reactive to light. Conjunctivae and EOM are normal. Left eye exhibits no discharge. No scleral icterus.   Neck: Normal range of motion. Neck supple.   Cardiovascular: Normal rate, regular rhythm and normal heart sounds.   Pulmonary/Chest: Effort normal and breath sounds normal. No stridor. No respiratory distress. He has no wheezes. He has no rales.   Abdominal: Soft. Bowel sounds are normal. He exhibits no distension.   Musculoskeletal: Normal range of motion. He exhibits no edema or deformity.   Neurological: He is alert and oriented to person, place, and time. No cranial nerve deficit.   Skin: Skin is warm and dry. Capillary refill takes 2 " to 3 seconds.   Psychiatric: He has a normal mood and affect.   Nursing note and vitals reviewed.        Data Review:   CBC:   Lab Results   Component Value Date    WBC 5.78 08/05/2019    RBC 5.05 08/05/2019    HGB 15.2 08/05/2019    HCT 47.5 08/05/2019     08/05/2019     BMP:   Lab Results   Component Value Date    GLU 90 06/11/2019     Radiology review:   Diagnostics: Chest x-ray:  Chest x-ray reviewed with patient shows hyperinflation       PFT  Normal MIP   Mildly reduced MEP   Normal spirometry, normal lung volumes, normal diffusion capacity, normal study.   Maximum voluntary ventilation is normal. - (MVV % predicted is greater than or equal to LLN )    FEV1: 2.98L ( 102.9%) FVC 4.44L( 118.7%)  FEV1/FVC 67  TLC 6.71L ( 108.5%)  DLCO 25.84* 106.5%)    MEP: 74%  MIP 96%    Assessment:      Problem List Items Addressed This Visit     Simple chronic bronchitis - Primary (Chronic)    Relevant Orders    Ambulatory Referral to Allergy    Occasional cough    Relevant Orders    Ambulatory Referral to Allergy         Plan:        Orders Placed This Encounter   Procedures    Ambulatory Referral to Allergy     Referral Priority:   Routine     Referral Type:   Allergy Testing     Referral Reason:   Specialty Services Required     Requested Specialty:   Allergy     Number of Visits Requested:   1     Cough resolved last 2 weeks  Consider see Allergist  Thinks may have been exposure to trees in neighbors nursery    Follow up if symptoms worsen or fail to improve, for Referal to Allergist.    This note was prepared using voice recognition system and is likely to have sound alike errors that may have been overlooked even after proof reading.  Please call me with any questions    Discussed diagnosis, its evaluation, treatment and usual course. All questions answered.    Thank you for the courtesy of participating in the care of this patient    Wil Coburn MD

## 2019-10-08 ENCOUNTER — TELEPHONE (OUTPATIENT)
Dept: PULMONOLOGY | Facility: CLINIC | Age: 64
End: 2019-10-08

## 2019-10-08 NOTE — TELEPHONE ENCOUNTER
"----- Message from Wil Coburn MD sent at 9/9/2019  3:03 PM CDT -----  Refer to ENT    Wli Coburn MD    ----- Message -----  From: Yeny Thompson LPN  Sent: 9/9/2019  12:59 PM  To: Wil Coburn MD    Patient's LV was 8/5/19 he was prescribed flonase. Patient states this is doing nothing for him, "I may as well be shooting water in my nose".  Patient is requesting something different. Please advise.     "

## 2019-10-08 NOTE — TELEPHONE ENCOUNTER
Patient had office visit on 10/3/19 and got referral for allergist. Appointment was scheduled at visit, 10/17/19 at [facility].

## 2019-10-24 ENCOUNTER — INITIAL CONSULT (OUTPATIENT)
Dept: DERMATOLOGY | Facility: CLINIC | Age: 64
End: 2019-10-24
Payer: MEDICARE

## 2019-10-24 ENCOUNTER — TELEPHONE (OUTPATIENT)
Dept: DERMATOLOGY | Facility: CLINIC | Age: 64
End: 2019-10-24

## 2019-10-24 DIAGNOSIS — L90.5 SCAR CONDITIONS/SKIN FIBROSIS: Primary | ICD-10-CM

## 2019-10-24 DIAGNOSIS — Z12.83 SCREENING, MALIGNANT NEOPLASM, SKIN: ICD-10-CM

## 2019-10-24 DIAGNOSIS — L60.3 ONYCHODYSTROPHY: ICD-10-CM

## 2019-10-24 DIAGNOSIS — L72.9 CYST OF SKIN: ICD-10-CM

## 2019-10-24 DIAGNOSIS — R23.8 SCALP IRRITATION: ICD-10-CM

## 2019-10-24 DIAGNOSIS — L57.0 ACTINIC KERATOSES: ICD-10-CM

## 2019-10-24 DIAGNOSIS — Z85.828 HISTORY OF SKIN CANCER: ICD-10-CM

## 2019-10-24 PROCEDURE — 99202 OFFICE O/P NEW SF 15 MIN: CPT | Mod: 25,S$GLB,, | Performed by: DERMATOLOGY

## 2019-10-24 PROCEDURE — 99202 PR OFFICE/OUTPT VISIT, NEW, LEVL II, 15-29 MIN: ICD-10-PCS | Mod: 25,S$GLB,, | Performed by: DERMATOLOGY

## 2019-10-24 PROCEDURE — 17000 PR DESTRUCTION(LASER SURGERY,CRYOSURGERY,CHEMOSURGERY),PREMALIGNANT LESIONS,FIRST LESION: ICD-10-PCS | Mod: S$GLB,,, | Performed by: DERMATOLOGY

## 2019-10-24 PROCEDURE — 17003 DESTRUCT PREMALG LES 2-14: CPT | Mod: S$GLB,,, | Performed by: DERMATOLOGY

## 2019-10-24 PROCEDURE — 1101F PT FALLS ASSESS-DOCD LE1/YR: CPT | Mod: CPTII,S$GLB,, | Performed by: DERMATOLOGY

## 2019-10-24 PROCEDURE — 99999 PR PBB SHADOW E&M-EST. PATIENT-LVL II: ICD-10-PCS | Mod: PBBFAC,,, | Performed by: DERMATOLOGY

## 2019-10-24 PROCEDURE — 17000 DESTRUCT PREMALG LESION: CPT | Mod: S$GLB,,, | Performed by: DERMATOLOGY

## 2019-10-24 PROCEDURE — 99999 PR PBB SHADOW E&M-EST. PATIENT-LVL II: CPT | Mod: PBBFAC,,, | Performed by: DERMATOLOGY

## 2019-10-24 PROCEDURE — 17003 DESTRUCTION, PREMALIGNANT LESIONS; SECOND THROUGH 14 LESIONS: ICD-10-PCS | Mod: S$GLB,,, | Performed by: DERMATOLOGY

## 2019-10-24 PROCEDURE — 1101F PR PT FALLS ASSESS DOC 0-1 FALLS W/OUT INJ PAST YR: ICD-10-PCS | Mod: CPTII,S$GLB,, | Performed by: DERMATOLOGY

## 2019-10-24 RX ORDER — CLOBETASOL PROPIONATE 0.05 G/100ML
SHAMPOO TOPICAL
Qty: 118 ML | Refills: 3 | Status: SHIPPED | OUTPATIENT
Start: 2019-10-24 | End: 2020-11-11

## 2019-10-24 NOTE — TELEPHONE ENCOUNTER
S/w pt about vitamins and shampoo not being covered advised we would either do a prior auth or send in a new prescription. Pt voiced understanding and had no further questions or concerns.

## 2019-10-24 NOTE — PROGRESS NOTES
Subjective:       Patient ID:  Chuy Bucio is a 63 y.o. male who presents for   Chief Complaint   Patient presents with    Rash     rash of face and scalp x 7 years tx clobex, murad, neutrogena retinol    Cyst     cyst of back x 4 years     Nail Problem     nail cracking x several years      Hx of NMSC of the right cheek    History of Present Illness: The patient presents with chief complaint of rash.  Location: face, scalp  Duration: 7 years  Signs/Symptoms: pruritis    Prior treatments: clobetasal, dermarest, neutrogena retinol, murad, clobex        Review of Systems   Constitutional: Negative for fever and chills.   Gastrointestinal: Negative for nausea and vomiting.   Skin: Positive for itching and rash. Negative for daily sunscreen use, activity-related sunscreen use and recent sunburn.   Hematologic/Lymphatic: Does not bruise/bleed easily.        Objective:    Physical Exam   Constitutional: He appears well-developed and well-nourished. No distress.   Neurological: He is alert and oriented to person, place, and time. He is not disoriented.   Psychiatric: He has a normal mood and affect.   Skin:   Areas Examined (abnormalities noted in diagram):   Scalp / Hair Palpated and Inspected  Head / Face Inspection Performed  Neck Inspection Performed  Chest / Axilla Inspection Performed  Abdomen Inspection Performed  Back Inspection Performed  RUE Inspected  LUE Inspection Performed  RLE Inspected  LLE Inspection Performed  Nails and Digits Inspection Performed                       Diagram Legend     Erythematous scaling macule/papule c/w actinic keratosis       Vascular papule c/w angioma      Pigmented verrucoid papule/plaque c/w seborrheic keratosis      Yellow umbilicated papule c/w sebaceous hyperplasia      Irregularly shaped tan macule c/w lentigo     1-2 mm smooth white papules consistent with Milia      Movable subcutaneous cyst with punctum c/w epidermal inclusion cyst      Subcutaneous movable  cyst c/w pilar cyst      Firm pink to brown papule c/w dermatofibroma      Pedunculated fleshy papule(s) c/w skin tag(s)      Evenly pigmented macule c/w junctional nevus     Mildly variegated pigmented, slightly irregular-bordered macule c/w mildly atypical nevus      Flesh colored to evenly pigmented papule c/w intradermal nevus       Pink pearly papule/plaque c/w basal cell carcinoma      Erythematous hyperkeratotic cursted plaque c/w SCC      Surgical scar with no sign of skin cancer recurrence      Open and closed comedones      Inflammatory papules and pustules      Verrucoid papule consistent consistent with wart     Erythematous eczematous patches and plaques     Dystrophic onycholytic nail with subungual debris c/w onychomycosis     Umbilicated papule    Erythematous-base heme-crusted tan verrucoid plaque consistent with inflamed seborrheic keratosis     Erythematous Silvery Scaling Plaque c/w Psoriasis     See annotation      Assessment / Plan:        Scar conditions/skin fibrosis  Screening, malignant neoplasm, skin  History of skin cancer  Scar of the right cheek, hx of NMSC.  No evidence of recurrence on physical exam today.  Continue routine skin surveillance. Daily sunscreen advised.    Cyst of skin  Given size and erythema, will schedule for 45 min procedure.    Onychodystrophy  Recommend increase OTC biotin    Actinic keratoses  Cryosurgery Procedure Note    The patient is informed of the precancerous quality and need for treatment of these lesions. After risks, benefits and alternatives explained, including blistering, pain, hyper- and hypopigmentation, patient verbally consents to cryotherapy to precancerous lesions. Liquid nitrogen cryosurgery is applied to the 6 actinic keratoses, as detailed in the physical exam, to produce a freeze injury. The patient is aware that blisters may form and is instructed on wound care with gentle cleansing and use of vaseline ointment to keep moist until healed.  The patient is supplied a handout on cryosurgery and is instructed to call if lesions do not completely resolve.             Follow up for 45 min procedure for cyst of skin, 6 months for skin check.

## 2019-10-24 NOTE — TELEPHONE ENCOUNTER
PA initiated for Clobetasol 0.05% shampoo via cover my meds.  Decision to be faxed over in 5-7 business days per pt insurance.

## 2019-10-24 NOTE — PATIENT INSTRUCTIONS
Start biotin 4978-1778 mcg daily  Viviscal or AG-Pro        CRYOSURGERY      Your doctor has used a method called cryosurgery to treat your skin condition. Cryosurgery refers to the use of very cold substances to treat a variety of skin conditions such as warts, pre-skin cancers, molluscum contagiosum, sun spots, and several benign growths. The substance we use in cryosurgery is liquid nitrogen and is so cold (-195 degrees Celsius) that is burns when administered.     Following treatment in the office, the skin may immediately burn and become red. You may find the area around the lesion is affected as well. It is sometimes necessary to treat not only the lesion, but a small area of the surrounding normal skin to achieve a good response.     A blister, and even a blood filled blister, may form after treatment.   This is a normal response. If the blister is painful, it is acceptable to sterilize a needle and with rubbing alcohol and gently pop the blister. It is important that you gently wash the area with soap and warm water as the blister fluid may contain wart virus if a wart was treated. Do no remove the roof of the blister.     The area treated can take anywhere from 1-3 weeks to heal. Healing time depends on the kind of skin lesion treated, the location, and how aggressively the lesion was treated. It is recommended that the areas treated are covered with Vaseline or bacitracin ointment and a band-aid. If a band-aid is not practical, just ointment applied several times per day will do. Keeping these areas moist will speed the healing time.    Treatment with liquid nitrogen can leave a scar. In dark skin, it may be a light or dark scar, in light skin it may be a white or pink scar. These will generally fade with time.    If you have any concerns after your treatment, please feel free to call the office.         [facility]  [address] [address]  Dept:  [phone]  Dept Fax: [phone]

## 2019-10-24 NOTE — TELEPHONE ENCOUNTER
PA approved for Clobetasol Propionate Shampoo 0.05% per pt insurance.    Approval Dates: 10/14/2019 - 12/31/2019    Pharmacy notified via fax

## 2019-10-30 ENCOUNTER — TELEPHONE (OUTPATIENT)
Dept: DERMATOLOGY | Facility: CLINIC | Age: 64
End: 2019-10-30

## 2019-10-30 NOTE — TELEPHONE ENCOUNTER
M for pt to return call to clinic   ----- Message from Loree Whalen sent at 10/30/2019  2:28 PM CDT -----  Contact: PATIENT  Calling concerning the RX prescribed is too still too expensive. Calling to get an alternative but nothing with tar in it. States it does nothing for him. Please call patient @ [phone]. Thanks, [id number] [place] - [address] [facility]  [address] [address]  Phone: [phone] Fax: [phone]

## 2019-10-31 ENCOUNTER — TELEPHONE (OUTPATIENT)
Dept: DERMATOLOGY | Facility: CLINIC | Age: 64
End: 2019-10-31

## 2019-10-31 DIAGNOSIS — R23.8 SCALP IRRITATION: ICD-10-CM

## 2019-10-31 RX ORDER — MOMETASONE FUROATE 1 MG/ML
SOLUTION TOPICAL
Qty: 30 ML | Refills: 3 | Status: SHIPPED | OUTPATIENT
Start: 2019-10-31 | End: 2020-11-11

## 2019-10-31 NOTE — TELEPHONE ENCOUNTER
Spoke to pt and he stated that the shampoo is to expensive and pt would like to maybe try a cream or something else pt stated it did not have to be a shampoo.

## 2020-01-06 ENCOUNTER — TELEPHONE (OUTPATIENT)
Dept: DERMATOLOGY | Facility: CLINIC | Age: 65
End: 2020-01-06

## 2020-01-06 NOTE — TELEPHONE ENCOUNTER
----- Message from Rosio Holcomb sent at 1/6/2020  8:22 AM CST -----  Contact: self [phone]  Pt would like return call from nurse regarding appt on 01/16/2020; pt is wanting to know if he will have cyst removal. Please call back at [phone].   Md Claribel

## 2020-01-14 ENCOUNTER — TELEPHONE (OUTPATIENT)
Dept: DERMATOLOGY | Facility: CLINIC | Age: 65
End: 2020-01-14

## 2020-01-14 NOTE — TELEPHONE ENCOUNTER
----- Message from Ruby Singh sent at 1/14/2020  2:01 PM CST -----  Contact: Pt   Pt is calling regarding requesting to have nurse  call to back. Pt states that call is in reference to pt deciding  not to do the procedure but will come in for the follow up appt. .[phone] (home)       .Thank You  Ruby Singh

## 2020-01-16 ENCOUNTER — OFFICE VISIT (OUTPATIENT)
Dept: DERMATOLOGY | Facility: CLINIC | Age: 65
End: 2020-01-16
Payer: MEDICARE

## 2020-01-16 DIAGNOSIS — L90.5 SCAR CONDITIONS/SKIN FIBROSIS: Primary | ICD-10-CM

## 2020-01-16 DIAGNOSIS — Z12.83 SCREENING, MALIGNANT NEOPLASM, SKIN: ICD-10-CM

## 2020-01-16 DIAGNOSIS — L57.0 ACTINIC KERATOSES: ICD-10-CM

## 2020-01-16 DIAGNOSIS — Z85.828 HISTORY OF SKIN CANCER: ICD-10-CM

## 2020-01-16 PROCEDURE — 99999 PR PBB SHADOW E&M-EST. PATIENT-LVL II: ICD-10-PCS | Mod: PBBFAC,,, | Performed by: DERMATOLOGY

## 2020-01-16 PROCEDURE — 1101F PT FALLS ASSESS-DOCD LE1/YR: CPT | Mod: CPTII,S$GLB,, | Performed by: DERMATOLOGY

## 2020-01-16 PROCEDURE — 17003 DESTRUCTION, PREMALIGNANT LESIONS; SECOND THROUGH 14 LESIONS: ICD-10-PCS | Mod: S$GLB,,, | Performed by: DERMATOLOGY

## 2020-01-16 PROCEDURE — 1159F MED LIST DOCD IN RCRD: CPT | Mod: S$GLB,,, | Performed by: DERMATOLOGY

## 2020-01-16 PROCEDURE — 99999 PR PBB SHADOW E&M-EST. PATIENT-LVL II: CPT | Mod: PBBFAC,,, | Performed by: DERMATOLOGY

## 2020-01-16 PROCEDURE — 17003 DESTRUCT PREMALG LES 2-14: CPT | Mod: S$GLB,,, | Performed by: DERMATOLOGY

## 2020-01-16 PROCEDURE — 99213 OFFICE O/P EST LOW 20 MIN: CPT | Mod: 25,S$GLB,, | Performed by: DERMATOLOGY

## 2020-01-16 PROCEDURE — 1159F PR MEDICATION LIST DOCUMENTED IN MEDICAL RECORD: ICD-10-PCS | Mod: S$GLB,,, | Performed by: DERMATOLOGY

## 2020-01-16 PROCEDURE — 17000 PR DESTRUCTION(LASER SURGERY,CRYOSURGERY,CHEMOSURGERY),PREMALIGNANT LESIONS,FIRST LESION: ICD-10-PCS | Mod: S$GLB,,, | Performed by: DERMATOLOGY

## 2020-01-16 PROCEDURE — 1101F PR PT FALLS ASSESS DOC 0-1 FALLS W/OUT INJ PAST YR: ICD-10-PCS | Mod: CPTII,S$GLB,, | Performed by: DERMATOLOGY

## 2020-01-16 PROCEDURE — 1126F AMNT PAIN NOTED NONE PRSNT: CPT | Mod: S$GLB,,, | Performed by: DERMATOLOGY

## 2020-01-16 PROCEDURE — 17000 DESTRUCT PREMALG LESION: CPT | Mod: S$GLB,,, | Performed by: DERMATOLOGY

## 2020-01-16 PROCEDURE — 1126F PR PAIN SEVERITY QUANTIFIED, NO PAIN PRESENT: ICD-10-PCS | Mod: S$GLB,,, | Performed by: DERMATOLOGY

## 2020-01-16 PROCEDURE — 99213 PR OFFICE/OUTPT VISIT, EST, LEVL III, 20-29 MIN: ICD-10-PCS | Mod: 25,S$GLB,, | Performed by: DERMATOLOGY

## 2020-01-16 NOTE — PROGRESS NOTES
Subjective:       Patient ID:  Chuy Bucio is a 64 y.o. male who presents for   Chief Complaint   Patient presents with    Spot     located on right upper arm      Hx of NMSC of the right cheek and AK's, last seen on 10/24/19.  He c/o several scaly areas of the bilateral temples. No tx tried.        Review of Systems   Constitutional: Negative for fever and chills.   Gastrointestinal: Negative for nausea and vomiting.   Skin: Positive for activity-related sunscreen use. Negative for daily sunscreen use and recent sunburn.   Hematologic/Lymphatic: Does not bruise/bleed easily.        Objective:    Physical Exam   Constitutional: He appears well-developed and well-nourished. No distress.   Neurological: He is alert and oriented to person, place, and time. He is not disoriented.   Psychiatric: He has a normal mood and affect.   Skin:   Areas Examined (abnormalities noted in diagram):   Scalp / Hair Palpated and Inspected  Head / Face Inspection Performed  Neck Inspection Performed  Chest / Axilla Inspection Performed  Abdomen Inspection Performed  Back Inspection Performed  RUE Inspected  LUE Inspection Performed  Nails and Digits Inspection Performed              Diagram Legend     Erythematous scaling macule/papule c/w actinic keratosis       Vascular papule c/w angioma      Pigmented verrucoid papule/plaque c/w seborrheic keratosis      Yellow umbilicated papule c/w sebaceous hyperplasia      Irregularly shaped tan macule c/w lentigo     1-2 mm smooth white papules consistent with Milia      Movable subcutaneous cyst with punctum c/w epidermal inclusion cyst      Subcutaneous movable cyst c/w pilar cyst      Firm pink to brown papule c/w dermatofibroma      Pedunculated fleshy papule(s) c/w skin tag(s)      Evenly pigmented macule c/w junctional nevus     Mildly variegated pigmented, slightly irregular-bordered macule c/w mildly atypical nevus      Flesh colored to evenly pigmented papule c/w intradermal  nevus       Pink pearly papule/plaque c/w basal cell carcinoma      Erythematous hyperkeratotic cursted plaque c/w SCC      Surgical scar with no sign of skin cancer recurrence      Open and closed comedones      Inflammatory papules and pustules      Verrucoid papule consistent consistent with wart     Erythematous eczematous patches and plaques     Dystrophic onycholytic nail with subungual debris c/w onychomycosis     Umbilicated papule    Erythematous-base heme-crusted tan verrucoid plaque consistent with inflamed seborrheic keratosis     Erythematous Silvery Scaling Plaque c/w Psoriasis     See annotation      Assessment / Plan:        Scar conditions/skin fibrosis  Screening, malignant neoplasm, skin  History of skin cancer  Scar of the right cheek, hx of NMSC.  No evidence of recurrence on physical exam today.  Continue routine skin surveillance. Daily sunscreen advised.    Actinic keratoses  Cryosurgery Procedure Note    The patient is informed of the precancerous quality and need for treatment of these lesions. After risks, benefits and alternatives explained, including blistering, pain, hyper- and hypopigmentation, patient verbally consents to cryotherapy to precancerous lesions. Liquid nitrogen cryosurgery is applied to the 5 actinic keratoses, as detailed in the physical exam, to produce a freeze injury. The patient is aware that blisters may form and is instructed on wound care with gentle cleansing and use of vaseline ointment to keep moist until healed. The patient is supplied a handout on cryosurgery and is instructed to call if lesions do not completely resolve.             Follow up in about 1 year (around 1/16/2021).

## 2020-01-16 NOTE — PATIENT INSTRUCTIONS

## 2020-01-16 NOTE — LETTER
January 16, 2020      MARISELA Marie MD  [address] [facility] [address]           [facility]  [address] [facility] [address]  Phone: [phone]  Fax: [phone]          Patient: Chuy Bucio   MR Number: [id number]   YOB: 1955   Date of Visit: 1/16/2020       Dear Dr. MARISELA Marie:    Thank you for referring Chuy Bucio to me for evaluation. Attached you will find relevant portions of my assessment and plan of care.    If you have questions, please do not hesitate to call me. I look forward to following Chuy Bucio along with you.    Sincerely,    Yadira Mcdowell MD    Enclosure  CC:  No Recipients    If you would like to receive this communication electronically, please contact [email or web address] or [phone] to request more information on [employer] Link access.    For providers and/or their staff who would like to refer a patient to [facility], please contact us through our one-stop-shop provider referral line, [facility], at [phone].    If you feel you have received this communication in error or would no longer like to receive these types of communications, please e-mail [email or web address]

## 2020-09-04 ENCOUNTER — TELEPHONE (OUTPATIENT)
Dept: INTERNAL MEDICINE | Facility: CLINIC | Age: 65
End: 2020-09-04

## 2020-09-04 DIAGNOSIS — Z12.5 SCREENING PSA (PROSTATE SPECIFIC ANTIGEN): ICD-10-CM

## 2020-09-04 DIAGNOSIS — A60.00 HERPES SIMPLEX INFECTION OF GENITOURINARY SYSTEM: ICD-10-CM

## 2020-09-04 DIAGNOSIS — Z79.899 ENCOUNTER FOR LONG-TERM CURRENT USE OF MEDICATION: ICD-10-CM

## 2020-09-04 DIAGNOSIS — Z13.1 SCREENING FOR DIABETES MELLITUS: Primary | ICD-10-CM

## 2020-09-04 DIAGNOSIS — Z13.220 SCREENING FOR LIPID DISORDERS: ICD-10-CM

## 2020-09-04 DIAGNOSIS — Z13.6 SCREENING FOR CARDIOVASCULAR CONDITION: ICD-10-CM

## 2020-09-04 RX ORDER — ACYCLOVIR 400 MG/1
400 TABLET ORAL 2 TIMES DAILY
Qty: 60 TABLET | Refills: 0 | Status: SHIPPED | OUTPATIENT
Start: 2020-09-04 | End: 2020-11-11 | Stop reason: SDUPTHER

## 2020-09-04 NOTE — TELEPHONE ENCOUNTER
----- Message from Iris Fowler sent at 9/4/2020  8:14 AM CDT -----  Contact: Chuy  .Type:  RX Refill Request    Who Called: Chuy  Refill or New Rx:refill  RX Name and Strength:acyclovir (ZOVIRAX) 400 MG tablet      How is the patient currently taking it? (ex. 1XDay):1x a day  Is this a 30 day or 90 day RX:180  Preferred Pharmacy with phone number:[id number] [place], [place] - [address] [facility]  Local or Mail Order:local  Ordering Provider:leonides  Would the patient rather a call back or a response via [facility]? Call back  Best Call Back Number:[phone]  Additional Information:

## 2020-09-04 NOTE — TELEPHONE ENCOUNTER
ACTION NEEDED:  Please notify patient of refill below and schedule him for labs and appointment with me in the office (not a telemedicine virtual visit) a few days later to review the test results. Thanks.    Orders Placed This Encounter   Procedures    PSA, Screening    Lipid Panel    Comprehensive metabolic panel       Medications Ordered This Encounter   Medications    acyclovir (ZOVIRAX) 400 MG tablet     Sig: Take 1 tablet (400 mg total) by mouth 2 (two) times daily.     Dispense:  60 tablet     Refill:  0    #LMRF

## 2020-09-10 DIAGNOSIS — A60.00 HERPES SIMPLEX INFECTION OF GENITOURINARY SYSTEM: ICD-10-CM

## 2020-09-11 DIAGNOSIS — Z12.11 COLON CANCER SCREENING: ICD-10-CM

## 2020-09-11 RX ORDER — ACYCLOVIR 400 MG/1
TABLET ORAL
Qty: 180 TABLET | OUTPATIENT
Start: 2020-09-11

## 2020-09-11 NOTE — TELEPHONE ENCOUNTER
Our records show that on 9/4/2020 we sent electronic prescription for acyclovir (ZOVIRAX) 400 MG tablet, a quantity of 60 tablet(s) with 0 additional refills to [id number] [place] [facility] [address] [facility], and the pharmacy confirmed their receipt of the prescription 9/4/2020 at 6:06 PM CDT.    REFILL REFUSED  REASON: He has upcoming appointment very soon. We will address this and any other refills at that appointment.    Requested Prescriptions     Refused Prescriptions Disp Refills    acyclovir (ZOVIRAX) 400 MG tablet [Pharmacy Med Name: ACYCLOVIR 400MG TABLETS] 180 tablet      Sig: TAKE 1 TABLET(400 MG) BY MOUTH TWICE DAILY     Refused By: ASHLEY EDWARDS     Reason for Refusal: Patient needs an appointment    #LMRX

## 2020-10-05 ENCOUNTER — PATIENT MESSAGE (OUTPATIENT)
Dept: ADMINISTRATIVE | Facility: HOSPITAL | Age: 65
End: 2020-10-05

## 2020-10-06 ENCOUNTER — PATIENT MESSAGE (OUTPATIENT)
Dept: ADMINISTRATIVE | Facility: HOSPITAL | Age: 65
End: 2020-10-06

## 2020-10-12 ENCOUNTER — PES CALL (OUTPATIENT)
Dept: ADMINISTRATIVE | Facility: CLINIC | Age: 65
End: 2020-10-12

## 2020-10-19 ENCOUNTER — PES CALL (OUTPATIENT)
Dept: ADMINISTRATIVE | Facility: CLINIC | Age: 65
End: 2020-10-19

## 2020-10-30 ENCOUNTER — PATIENT MESSAGE (OUTPATIENT)
Dept: ADMINISTRATIVE | Facility: HOSPITAL | Age: 65
End: 2020-10-30

## 2020-11-11 ENCOUNTER — OFFICE VISIT (OUTPATIENT)
Dept: INTERNAL MEDICINE | Facility: CLINIC | Age: 65
End: 2020-11-11
Payer: MEDICARE

## 2020-11-11 ENCOUNTER — LAB VISIT (OUTPATIENT)
Dept: LAB | Facility: HOSPITAL | Age: 65
End: 2020-11-11
Attending: FAMILY MEDICINE
Payer: MEDICARE

## 2020-11-11 VITALS
DIASTOLIC BLOOD PRESSURE: 70 MMHG | WEIGHT: 148.81 LBS | OXYGEN SATURATION: 97 % | TEMPERATURE: 97 F | SYSTOLIC BLOOD PRESSURE: 110 MMHG | HEART RATE: 95 BPM | HEIGHT: 65 IN | BODY MASS INDEX: 24.79 KG/M2

## 2020-11-11 DIAGNOSIS — Z12.5 SCREENING PSA (PROSTATE SPECIFIC ANTIGEN): ICD-10-CM

## 2020-11-11 DIAGNOSIS — Z00.00 PREVENTATIVE HEALTH CARE: ICD-10-CM

## 2020-11-11 DIAGNOSIS — Z23 NEED FOR 23-POLYVALENT PNEUMOCOCCAL POLYSACCHARIDE VACCINE: ICD-10-CM

## 2020-11-11 DIAGNOSIS — Z23 NEED FOR INFLUENZA VACCINATION: ICD-10-CM

## 2020-11-11 DIAGNOSIS — A60.00 HERPES SIMPLEX INFECTION OF GENITOURINARY SYSTEM: ICD-10-CM

## 2020-11-11 DIAGNOSIS — Z79.899 ENCOUNTER FOR LONG-TERM CURRENT USE OF MEDICATION: ICD-10-CM

## 2020-11-11 DIAGNOSIS — Z12.11 SCREENING FOR COLORECTAL CANCER: ICD-10-CM

## 2020-11-11 DIAGNOSIS — Z13.220 SCREENING FOR LIPID DISORDERS: ICD-10-CM

## 2020-11-11 DIAGNOSIS — J41.0 SIMPLE CHRONIC BRONCHITIS: ICD-10-CM

## 2020-11-11 DIAGNOSIS — Z12.12 SCREENING FOR COLORECTAL CANCER: ICD-10-CM

## 2020-11-11 DIAGNOSIS — Z00.00 PREVENTATIVE HEALTH CARE: Primary | ICD-10-CM

## 2020-11-11 PROBLEM — R05.8 OCCASIONAL COUGH: Status: RESOLVED | Noted: 2019-10-03 | Resolved: 2020-11-11

## 2020-11-11 LAB
ALBUMIN SERPL BCP-MCNC: 4 G/DL (ref 3.5–5.2)
ALP SERPL-CCNC: 76 U/L (ref 55–135)
ALT SERPL W/O P-5'-P-CCNC: 23 U/L (ref 10–44)
ANION GAP SERPL CALC-SCNC: 6 MMOL/L (ref 8–16)
AST SERPL-CCNC: 24 U/L (ref 10–40)
BILIRUB SERPL-MCNC: 1.1 MG/DL (ref 0.1–1)
BUN SERPL-MCNC: 20 MG/DL (ref 8–23)
CALCIUM SERPL-MCNC: 9.2 MG/DL (ref 8.7–10.5)
CHLORIDE SERPL-SCNC: 102 MMOL/L (ref 95–110)
CHOLEST SERPL-MCNC: 200 MG/DL (ref 120–199)
CHOLEST/HDLC SERPL: 4 {RATIO} (ref 2–5)
CO2 SERPL-SCNC: 32 MMOL/L (ref 23–29)
COMPLEXED PSA SERPL-MCNC: 0.22 NG/ML (ref 0–4)
CREAT SERPL-MCNC: 0.9 MG/DL (ref 0.5–1.4)
EST. GFR  (AFRICAN AMERICAN): >60 ML/MIN/1.73 M^2
EST. GFR  (NON AFRICAN AMERICAN): >60 ML/MIN/1.73 M^2
GLUCOSE SERPL-MCNC: 79 MG/DL (ref 70–110)
HDLC SERPL-MCNC: 50 MG/DL (ref 40–75)
HDLC SERPL: 25 % (ref 20–50)
LDLC SERPL CALC-MCNC: 127.4 MG/DL (ref 63–159)
NONHDLC SERPL-MCNC: 150 MG/DL
POTASSIUM SERPL-SCNC: 4.1 MMOL/L (ref 3.5–5.1)
PROT SERPL-MCNC: 6.9 G/DL (ref 6–8.4)
SODIUM SERPL-SCNC: 140 MMOL/L (ref 136–145)
TRIGL SERPL-MCNC: 113 MG/DL (ref 30–150)

## 2020-11-11 PROCEDURE — 3008F PR BODY MASS INDEX (BMI) DOCUMENTED: ICD-10-PCS | Mod: CPTII,S$GLB,, | Performed by: FAMILY MEDICINE

## 2020-11-11 PROCEDURE — 90694 FLU VACCINE - QUADRIVALENT - ADJUVANTED: ICD-10-PCS | Mod: S$GLB,,, | Performed by: FAMILY MEDICINE

## 2020-11-11 PROCEDURE — G0008 FLU VACCINE - QUADRIVALENT - ADJUVANTED: ICD-10-PCS | Mod: S$GLB,,, | Performed by: FAMILY MEDICINE

## 2020-11-11 PROCEDURE — 36415 COLL VENOUS BLD VENIPUNCTURE: CPT

## 2020-11-11 PROCEDURE — 1101F PR PT FALLS ASSESS DOC 0-1 FALLS W/OUT INJ PAST YR: ICD-10-PCS | Mod: CPTII,S$GLB,, | Performed by: FAMILY MEDICINE

## 2020-11-11 PROCEDURE — 99499 RISK ADDL DX/OHS AUDIT: ICD-10-PCS | Mod: ,,, | Performed by: FAMILY MEDICINE

## 2020-11-11 PROCEDURE — 99214 PR OFFICE/OUTPT VISIT, EST, LEVL IV, 30-39 MIN: ICD-10-PCS | Mod: 25,S$GLB,, | Performed by: FAMILY MEDICINE

## 2020-11-11 PROCEDURE — 99214 OFFICE O/P EST MOD 30 MIN: CPT | Mod: 25,S$GLB,, | Performed by: FAMILY MEDICINE

## 2020-11-11 PROCEDURE — 80053 COMPREHEN METABOLIC PANEL: CPT

## 2020-11-11 PROCEDURE — 1159F PR MEDICATION LIST DOCUMENTED IN MEDICAL RECORD: ICD-10-PCS | Mod: S$GLB,,, | Performed by: FAMILY MEDICINE

## 2020-11-11 PROCEDURE — G0009 PNEUMOCOCCAL POLYSACCHARIDE VACCINE 23-VALENT =>2YO SQ IM: ICD-10-PCS | Mod: S$GLB,,, | Performed by: FAMILY MEDICINE

## 2020-11-11 PROCEDURE — 3008F BODY MASS INDEX DOCD: CPT | Mod: CPTII,S$GLB,, | Performed by: FAMILY MEDICINE

## 2020-11-11 PROCEDURE — 90732 PNEUMOCOCCAL POLYSACCHARIDE VACCINE 23-VALENT =>2YO SQ IM: ICD-10-PCS | Mod: S$GLB,,, | Performed by: FAMILY MEDICINE

## 2020-11-11 PROCEDURE — 90694 VACC AIIV4 NO PRSRV 0.5ML IM: CPT | Mod: S$GLB,,, | Performed by: FAMILY MEDICINE

## 2020-11-11 PROCEDURE — 99499 UNLISTED E&M SERVICE: CPT | Mod: S$GLB,,, | Performed by: FAMILY MEDICINE

## 2020-11-11 PROCEDURE — 1126F PR PAIN SEVERITY QUANTIFIED, NO PAIN PRESENT: ICD-10-PCS | Mod: S$GLB,,, | Performed by: FAMILY MEDICINE

## 2020-11-11 PROCEDURE — G0009 ADMIN PNEUMOCOCCAL VACCINE: HCPCS | Mod: S$GLB,,, | Performed by: FAMILY MEDICINE

## 2020-11-11 PROCEDURE — 99999 PR PBB SHADOW E&M-EST. PATIENT-LVL III: ICD-10-PCS | Mod: PBBFAC,,, | Performed by: FAMILY MEDICINE

## 2020-11-11 PROCEDURE — 80061 LIPID PANEL: CPT

## 2020-11-11 PROCEDURE — 99499 UNLISTED E&M SERVICE: CPT | Mod: ,,, | Performed by: FAMILY MEDICINE

## 2020-11-11 PROCEDURE — 99499 RISK ADDL DX/OHS AUDIT: ICD-10-PCS | Mod: S$GLB,,, | Performed by: FAMILY MEDICINE

## 2020-11-11 PROCEDURE — 1101F PT FALLS ASSESS-DOCD LE1/YR: CPT | Mod: CPTII,S$GLB,, | Performed by: FAMILY MEDICINE

## 2020-11-11 PROCEDURE — 90732 PPSV23 VACC 2 YRS+ SUBQ/IM: CPT | Mod: S$GLB,,, | Performed by: FAMILY MEDICINE

## 2020-11-11 PROCEDURE — 99999 PR PBB SHADOW E&M-EST. PATIENT-LVL III: CPT | Mod: PBBFAC,,, | Performed by: FAMILY MEDICINE

## 2020-11-11 PROCEDURE — 84153 ASSAY OF PSA TOTAL: CPT

## 2020-11-11 PROCEDURE — 1126F AMNT PAIN NOTED NONE PRSNT: CPT | Mod: S$GLB,,, | Performed by: FAMILY MEDICINE

## 2020-11-11 PROCEDURE — G0008 ADMIN INFLUENZA VIRUS VAC: HCPCS | Mod: S$GLB,,, | Performed by: FAMILY MEDICINE

## 2020-11-11 PROCEDURE — 1159F MED LIST DOCD IN RCRD: CPT | Mod: S$GLB,,, | Performed by: FAMILY MEDICINE

## 2020-11-11 RX ORDER — ACYCLOVIR 400 MG/1
400 TABLET ORAL DAILY
Qty: 90 TABLET | Refills: 4 | Status: SHIPPED | OUTPATIENT
Start: 2020-11-11 | End: 2020-11-23 | Stop reason: SDUPTHER

## 2020-11-11 NOTE — PROGRESS NOTES
CHIEF COMPLAINT  Annual Exam    HEALTH MAINTENANCE INTERVENTIONS - UP TO DATE  Health Maintenance Topics with due status: Not Due       Topic Last Completion Date    TETANUS VACCINE 01/01/2012    Lipid Panel 06/11/2019       HEALTH MAINTENANCE INTERVENTIONS - DUE OR DUE SOON  Health Maintenance Due   Topic Date Due    PROSTATE-SPECIFIC ANTIGEN  06/11/2020    Colorectal Cancer Screening  07/01/2020        SUMMARY OF DIAGNOSES AND NEW ORDERS  Assessment   Preventative health care  -     Comprehensive Metabolic Panel; Future; Expected date: 11/11/2020  -     Lipid Panel; Future; Expected date: 11/11/2020  -     PSA, Screening; Future; Expected date: 11/11/2020  -     Case request GI: COLONOSCOPY    Simple chronic bronchitis    Screening for lipid disorders  -     Lipid Panel; Future; Expected date: 11/11/2020    Screening for colorectal cancer  -     Case request GI: COLONOSCOPY  -     Cancel: Fecal Immunochemical Test (iFOBT); Future; Expected date: 11/11/2020    Screening PSA (prostate specific antigen)  -     PSA, Screening; Future; Expected date: 11/11/2020    Encounter for long-term current use of medication  -     Comprehensive Metabolic Panel; Future; Expected date: 11/11/2020  -     Lipid Panel; Future; Expected date: 11/11/2020    History of herpes simplex infection of genitourinary system  -     acyclovir (ZOVIRAX) 400 MG tablet; Take 1 tablet (400 mg total) by mouth once daily.  Dispense: 90 tablet; Refill: 4    Need for influenza vaccination    Need for 23-polyvalent pneumococcal polysaccharide vaccine  -     Pneumococcal Polysaccharide Vaccine (23 Valent) (SQ/IM)    Other orders  -     Influenza - Quadrivalent (Adjuvanted)         Except as noted herein, any chronic conditions are compensated/controlled and stable, and no other significant new complaints or concerns reported.     Plan   Problem List Items Addressed This Visit        Pulmonary    Simple chronic bronchitis (Chronic)    Current Assessment &  Plan     Compensated/controlled and stable.             ID    History of herpes simplex infection of genitourinary system    Current Assessment & Plan     Suppressed on acyclovir 400 mg daily, which he wants to continue.         Relevant Medications    acyclovir (ZOVIRAX) 400 MG tablet      Other Visit Diagnoses     Preventative health care    -  Primary    Relevant Orders    Comprehensive Metabolic Panel    Lipid Panel    PSA, Screening    Case request GI: COLONOSCOPY (Completed)    Screening for lipid disorders        Relevant Orders    Lipid Panel    Screening for colorectal cancer        Relevant Orders    Case request GI: COLONOSCOPY (Completed)    Screening PSA (prostate specific antigen)        Relevant Orders    PSA, Screening    Encounter for long-term current use of medication        Relevant Orders    Comprehensive Metabolic Panel    Lipid Panel    Need for influenza vaccination        Need for 23-polyvalent pneumococcal polysaccharide vaccine        Relevant Orders    Pneumococcal Polysaccharide Vaccine (23 Valent) (SQ/IM) (Completed)          Outpatient Medications Prior to Visit   Medication Sig Dispense Refill    acyclovir (ZOVIRAX) 400 MG tablet Take 1 tablet (400 mg total) by mouth 2 (two) times daily. 60 tablet 0    FLUAD 8215-3050, 65 YR UP,,PF, 45 mcg (15 mcg x 3)/0.5 mL Syrg ADM 0.5ML IM UTD  0    varicella-zoster gE-AS01B, PF, (SHINGRIX) 50 mcg/0.5 mL injection Inject 0.5 mL (one dose) into muscle now; give second dose at least 2 months later 0.5 mL 1    clobetasol (CLOBEX) 0.05 % shampoo Use as shampoo twice weekly (Patient not taking: Reported on 11/11/2020) 118 mL 3    mometasone (ELOCON) 0.1 % solution AAA of scalp bid prn irritation (Patient not taking: Reported on 1/16/2020) 30 mL 3    pantoprazole (PROTONIX) 40 MG tablet Take 1 tablet (40 mg total) by mouth once daily. (Patient not taking: Reported on 10/3/2019) 30 tablet 11     No facility-administered medications prior to visit.   "       Medications Discontinued During This Encounter   Medication Reason    clobetasol (CLOBEX) 0.05 % shampoo Patient no longer taking    mometasone (ELOCON) 0.1 % solution Patient no longer taking    FLUAD 4781-5001, 65 YR UP,,PF, 45 mcg (15 mcg x 3)/0.5 mL Syrg Therapy completed    varicella-zoster gE-AS01B, PF, (SHINGRIX) 50 mcg/0.5 mL injection Therapy completed    pantoprazole (PROTONIX) 40 MG tablet Patient no longer taking    acyclovir (ZOVIRAX) 400 MG tablet Reorder        Medications Ordered This Encounter   Medications    acyclovir (ZOVIRAX) 400 MG tablet     Sig: Take 1 tablet (400 mg total) by mouth once daily.     Dispense:  90 tablet     Refill:  4       Follow up in about 1 year (around 11/11/2021) for wellness and preventive services.     Subjective   Review of Systems   Constitutional: Negative for chills and fever.   HENT: Negative for ear pain and trouble swallowing.    Eyes: Negative for pain and visual disturbance.   Respiratory: Negative for chest tightness and shortness of breath.    Cardiovascular: Negative for chest pain and palpitations.   Gastrointestinal: Negative for abdominal pain and blood in stool.   Endocrine: Negative for polydipsia and polyuria.   Genitourinary: Negative.  Negative for difficulty urinating, dysuria and hematuria.   Musculoskeletal: Negative for gait problem and joint swelling.   Integumentary:  Negative for rash and wound.   Neurological: Negative for vertigo and syncope.   Hematological: Negative.    Psychiatric/Behavioral: Negative for agitation and confusion.        Objective   Vitals:    11/11/20 0713   BP: 110/70   BP Location: Left arm   Patient Position: Sitting   BP Method: Medium (Manual)   Pulse: 95   Temp: 96.9 °F (36.1 °C)   TempSrc: Tympanic   SpO2: 97%   Weight: 67.5 kg (148 lb 13 oz)   Height: 5' 5" (1.651 m)     Physical Exam  Vitals signs reviewed.   Constitutional:       General: He is not in acute distress.     Appearance: Normal " "appearance.   Eyes:      General: No scleral icterus.     Conjunctiva/sclera: Conjunctivae normal.   Neck:      Musculoskeletal: No muscular tenderness.      Vascular: No carotid bruit.   Cardiovascular:      Rate and Rhythm: Normal rate and regular rhythm.      Heart sounds: Normal heart sounds.   Pulmonary:      Effort: Pulmonary effort is normal. No respiratory distress.      Breath sounds: Normal breath sounds. No wheezing or rhonchi.   Abdominal:      General: Bowel sounds are normal. There is no distension.      Palpations: Abdomen is soft. There is no mass.      Tenderness: There is no abdominal tenderness.   Lymphadenopathy:      Cervical: No cervical adenopathy.   Skin:     General: Skin is warm and dry.      Coloration: Skin is not jaundiced.   Neurological:      General: No focal deficit present.      Mental Status: He is alert and oriented to person, place, and time.   Psychiatric:         Mood and Affect: Mood normal.         Behavior: Behavior normal.         Judgment: Judgment normal.         No images are attached to the encounter.  TOTAL TIME evaluating and managing this patient for this encounter exceeded 25 minutes, the majority spent counseling and coordinating care for the listed diagnoses.   Documentation entered by me for this encounter may have been done in part using speech-recognition technology. Although I have made an effort to ensure accuracy, "sound like" errors may exist and should be interpreted in context. -MARISELA Marie MD.  "

## 2020-11-19 NOTE — PROGRESS NOTES
"Chuy Dhoerty.    I'm happy to report that these results are fine and do not require a change in treatment.    Thanks for letting me care for you, thanks for trusting us with your healthcare needs, and thanks for using [facility].    Sincerely,    MARISELA Marie MD  --------------------------------------------------------------------------------   Want to learn more about your test results and what they mean?  (1) Log in to your [facility] account at [email or web address].  (2) From the "View test results" tab, click on the test you want to know more about.  (3) Click on the "About This Test" link."

## 2020-11-23 DIAGNOSIS — A60.00 HERPES SIMPLEX INFECTION OF GENITOURINARY SYSTEM: ICD-10-CM

## 2020-12-02 RX ORDER — ACYCLOVIR 400 MG/1
400 TABLET ORAL DAILY
Qty: 90 TABLET | Refills: 4 | Status: SHIPPED | OUTPATIENT
Start: 2020-12-02 | End: 2022-01-05

## 2020-12-03 NOTE — TELEPHONE ENCOUNTER
REFILL REQUEST APPROVED.  Requested Prescriptions   Pending Prescriptions Disp Refills    acyclovir (ZOVIRAX) 400 MG tablet 90 tablet 4     Sig: Take 1 tablet (400 mg total) by mouth once daily.

## 2020-12-07 ENCOUNTER — PATIENT OUTREACH (OUTPATIENT)
Dept: ADMINISTRATIVE | Facility: HOSPITAL | Age: 65
End: 2020-12-07

## 2021-03-11 ENCOUNTER — TELEPHONE (OUTPATIENT)
Dept: ENDOSCOPY | Facility: HOSPITAL | Age: 66
End: 2021-03-11

## 2021-03-30 ENCOUNTER — TELEPHONE (OUTPATIENT)
Dept: ENDOSCOPY | Facility: HOSPITAL | Age: 66
End: 2021-03-30

## 2021-04-21 ENCOUNTER — PATIENT OUTREACH (OUTPATIENT)
Dept: ADMINISTRATIVE | Facility: OTHER | Age: 66
End: 2021-04-21

## 2021-04-22 ENCOUNTER — OFFICE VISIT (OUTPATIENT)
Dept: PODIATRY | Facility: CLINIC | Age: 66
End: 2021-04-22
Payer: MEDICARE

## 2021-04-22 VITALS — BODY MASS INDEX: 25.41 KG/M2 | WEIGHT: 152.69 LBS

## 2021-04-22 DIAGNOSIS — M77.42 METATARSALGIA OF BOTH FEET: ICD-10-CM

## 2021-04-22 DIAGNOSIS — S91.209A AVULSION OF TOENAIL, INITIAL ENCOUNTER: Primary | ICD-10-CM

## 2021-04-22 DIAGNOSIS — M72.2 PLANTAR FASCIAL FIBROMATOSIS OF BOTH FEET: ICD-10-CM

## 2021-04-22 DIAGNOSIS — M77.41 METATARSALGIA OF BOTH FEET: ICD-10-CM

## 2021-04-22 PROCEDURE — 1159F MED LIST DOCD IN RCRD: CPT | Mod: S$GLB,,, | Performed by: PODIATRIST

## 2021-04-22 PROCEDURE — 99204 PR OFFICE/OUTPT VISIT, NEW, LEVL IV, 45-59 MIN: ICD-10-PCS | Mod: S$GLB,,, | Performed by: PODIATRIST

## 2021-04-22 PROCEDURE — 3288F PR FALLS RISK ASSESSMENT DOCUMENTED: ICD-10-PCS | Mod: CPTII,S$GLB,, | Performed by: PODIATRIST

## 2021-04-22 PROCEDURE — 1101F PR PT FALLS ASSESS DOC 0-1 FALLS W/OUT INJ PAST YR: ICD-10-PCS | Mod: CPTII,S$GLB,, | Performed by: PODIATRIST

## 2021-04-22 PROCEDURE — 99999 PR PBB SHADOW E&M-EST. PATIENT-LVL II: ICD-10-PCS | Mod: PBBFAC,,, | Performed by: PODIATRIST

## 2021-04-22 PROCEDURE — 3288F FALL RISK ASSESSMENT DOCD: CPT | Mod: CPTII,S$GLB,, | Performed by: PODIATRIST

## 2021-04-22 PROCEDURE — 3008F BODY MASS INDEX DOCD: CPT | Mod: CPTII,S$GLB,, | Performed by: PODIATRIST

## 2021-04-22 PROCEDURE — 99999 PR PBB SHADOW E&M-EST. PATIENT-LVL II: CPT | Mod: PBBFAC,,, | Performed by: PODIATRIST

## 2021-04-22 PROCEDURE — 1101F PT FALLS ASSESS-DOCD LE1/YR: CPT | Mod: CPTII,S$GLB,, | Performed by: PODIATRIST

## 2021-04-22 PROCEDURE — 1159F PR MEDICATION LIST DOCUMENTED IN MEDICAL RECORD: ICD-10-PCS | Mod: S$GLB,,, | Performed by: PODIATRIST

## 2021-04-22 PROCEDURE — 99204 OFFICE O/P NEW MOD 45 MIN: CPT | Mod: S$GLB,,, | Performed by: PODIATRIST

## 2021-04-22 PROCEDURE — 3008F PR BODY MASS INDEX (BMI) DOCUMENTED: ICD-10-PCS | Mod: CPTII,S$GLB,, | Performed by: PODIATRIST

## 2021-05-14 ENCOUNTER — TELEPHONE (OUTPATIENT)
Dept: INTERNAL MEDICINE | Facility: CLINIC | Age: 66
End: 2021-05-14

## 2021-06-07 ENCOUNTER — PATIENT OUTREACH (OUTPATIENT)
Dept: ADMINISTRATIVE | Facility: OTHER | Age: 66
End: 2021-06-07

## 2021-06-07 DIAGNOSIS — Z12.11 ENCOUNTER FOR FIT (FECAL IMMUNOCHEMICAL TEST) SCREENING: Primary | ICD-10-CM

## 2021-06-08 ENCOUNTER — OFFICE VISIT (OUTPATIENT)
Dept: PODIATRY | Facility: CLINIC | Age: 66
End: 2021-06-08
Payer: MEDICARE

## 2021-06-08 VITALS — HEIGHT: 65 IN | WEIGHT: 152.13 LBS | BODY MASS INDEX: 25.34 KG/M2

## 2021-06-08 DIAGNOSIS — S91.209D AVULSION OF TOENAIL, SUBSEQUENT ENCOUNTER: Primary | ICD-10-CM

## 2021-06-08 PROCEDURE — 3008F BODY MASS INDEX DOCD: CPT | Mod: CPTII,S$GLB,, | Performed by: PODIATRIST

## 2021-06-08 PROCEDURE — 1159F MED LIST DOCD IN RCRD: CPT | Mod: S$GLB,,, | Performed by: PODIATRIST

## 2021-06-08 PROCEDURE — 99213 OFFICE O/P EST LOW 20 MIN: CPT | Mod: S$GLB,,, | Performed by: PODIATRIST

## 2021-06-08 PROCEDURE — 99999 PR PBB SHADOW E&M-EST. PATIENT-LVL II: CPT | Mod: PBBFAC,,, | Performed by: PODIATRIST

## 2021-06-08 PROCEDURE — 1159F PR MEDICATION LIST DOCUMENTED IN MEDICAL RECORD: ICD-10-PCS | Mod: S$GLB,,, | Performed by: PODIATRIST

## 2021-06-08 PROCEDURE — 99213 PR OFFICE/OUTPT VISIT, EST, LEVL III, 20-29 MIN: ICD-10-PCS | Mod: S$GLB,,, | Performed by: PODIATRIST

## 2021-06-08 PROCEDURE — 3008F PR BODY MASS INDEX (BMI) DOCUMENTED: ICD-10-PCS | Mod: CPTII,S$GLB,, | Performed by: PODIATRIST

## 2021-06-08 PROCEDURE — 99999 PR PBB SHADOW E&M-EST. PATIENT-LVL II: ICD-10-PCS | Mod: PBBFAC,,, | Performed by: PODIATRIST

## 2021-06-28 ENCOUNTER — PATIENT MESSAGE (OUTPATIENT)
Dept: DERMATOLOGY | Facility: CLINIC | Age: 66
End: 2021-06-28

## 2021-07-06 ENCOUNTER — PATIENT MESSAGE (OUTPATIENT)
Dept: ADMINISTRATIVE | Facility: HOSPITAL | Age: 66
End: 2021-07-06

## 2021-07-07 ENCOUNTER — PATIENT OUTREACH (OUTPATIENT)
Dept: ADMINISTRATIVE | Facility: HOSPITAL | Age: 66
End: 2021-07-07

## 2022-01-03 ENCOUNTER — TELEPHONE (OUTPATIENT)
Dept: INTERNAL MEDICINE | Facility: CLINIC | Age: 67
End: 2022-01-03
Payer: MEDICARE

## 2022-01-03 NOTE — TELEPHONE ENCOUNTER
----- Message from [facility] sent at 1/3/2022  8:11 AM CST -----  .Type:  RX Refill Request    Who Called: PT     Refill or New Rx: REFILL     RX Name and Strength: acyclovir (ZOVIRAX) 400 MG tablet    Preferred Pharmacy with phone number: [facility] [phone]     Pt Call Back Number: [phone]    Additional Information: Thank You

## 2022-01-03 NOTE — TELEPHONE ENCOUNTER
Called and left a voicemail for patient that he has refills of his prescription at [facility] and just needs to have them fill it for him.

## 2022-01-04 DIAGNOSIS — A60.00 HERPES SIMPLEX INFECTION OF GENITOURINARY SYSTEM: ICD-10-CM

## 2022-01-04 NOTE — TELEPHONE ENCOUNTER
----- Message from Javier Arana sent at 1/4/2022  1:29 PM CST -----  Contact: self  Chuy Bucio would like a call back at [phone], in regards to his medication, he states that he was told by Rick that the medication had been called, but the pharmacy told him no one called the medication. He would like a call back as soon as possible.

## 2022-01-04 NOTE — TELEPHONE ENCOUNTER
Care Due:                  Date            Visit Type   Department     Provider  --------------------------------------------------------------------------------                                             [facility]  Last Visit: 11-      None         ESAU Marie  Next Visit: None Scheduled  None         None Found                                                            Last  Test          Frequency    Reason                     Performed    Due Date  --------------------------------------------------------------------------------    Office Visit  12 months..  acyclovir................  11- 11-    CBC.........  12 months..  acyclovir................  Not Found    Overdue    Cr..........  12 months..  acyclovir................  11- 11-    Powered by [employer]. Reference number: [id number].   1/04/2022 1:22:33 PM CST

## 2022-01-05 RX ORDER — ACYCLOVIR 400 MG/1
TABLET ORAL
Qty: 90 TABLET | Refills: 0 | Status: SHIPPED | OUTPATIENT
Start: 2022-01-05 | End: 2022-02-22 | Stop reason: SDUPTHER

## 2022-01-05 NOTE — TELEPHONE ENCOUNTER
REFILL APPROVED. Will address further refills at upcoming appointment with me listed below.  Requested Prescriptions   Pending Prescriptions Disp Refills    acyclovir (ZOVIRAX) 400 MG tablet [Pharmacy Med Name: ACYCLOVIR 400MG TABLETS] 90 tablet 0     Sig: TAKE 1 TABLET(400 MG) BY MOUTH EVERY DAY    #LMRX   --------------------------------  Future Appointments   Date Time Provider Department Center   2/22/2022  7:30 AM MARISELA Marie MD [facility]

## 2022-01-26 ENCOUNTER — PES CALL (OUTPATIENT)
Dept: ADMINISTRATIVE | Facility: CLINIC | Age: 67
End: 2022-01-26
Payer: MEDICARE

## 2022-02-22 ENCOUNTER — HOSPITAL ENCOUNTER (OUTPATIENT)
Dept: RADIOLOGY | Facility: HOSPITAL | Age: 67
Discharge: HOME | End: 2022-02-22
Attending: FAMILY MEDICINE
Payer: MEDICARE

## 2022-02-22 ENCOUNTER — OFFICE VISIT (OUTPATIENT)
Dept: INTERNAL MEDICINE | Facility: CLINIC | Age: 67
End: 2022-02-22
Payer: MEDICARE

## 2022-02-22 VITALS
DIASTOLIC BLOOD PRESSURE: 80 MMHG | HEART RATE: 88 BPM | OXYGEN SATURATION: 96 % | HEIGHT: 65 IN | WEIGHT: 145.31 LBS | SYSTOLIC BLOOD PRESSURE: 110 MMHG | BODY MASS INDEX: 24.21 KG/M2

## 2022-02-22 DIAGNOSIS — G89.29 CHRONIC LEFT-SIDED LOW BACK PAIN WITHOUT SCIATICA: Primary | ICD-10-CM

## 2022-02-22 DIAGNOSIS — E78.5 DYSLIPIDEMIA: ICD-10-CM

## 2022-02-22 DIAGNOSIS — J41.0 SIMPLE CHRONIC BRONCHITIS: ICD-10-CM

## 2022-02-22 DIAGNOSIS — Z28.21 IMMUNIZATION NOT CARRIED OUT BECAUSE OF PATIENT REFUSAL: ICD-10-CM

## 2022-02-22 DIAGNOSIS — M54.50 CHRONIC LEFT-SIDED LOW BACK PAIN WITHOUT SCIATICA: ICD-10-CM

## 2022-02-22 DIAGNOSIS — M54.50 CHRONIC LEFT-SIDED LOW BACK PAIN WITHOUT SCIATICA: Primary | ICD-10-CM

## 2022-02-22 DIAGNOSIS — A60.00 HERPES SIMPLEX INFECTION OF GENITOURINARY SYSTEM: ICD-10-CM

## 2022-02-22 DIAGNOSIS — G89.29 CHRONIC LEFT-SIDED LOW BACK PAIN WITHOUT SCIATICA: ICD-10-CM

## 2022-02-22 DIAGNOSIS — Z23 NEED FOR TD VACCINE: ICD-10-CM

## 2022-02-22 DIAGNOSIS — Z12.11 SCREENING FOR COLORECTAL CANCER: ICD-10-CM

## 2022-02-22 DIAGNOSIS — Z12.12 SCREENING FOR COLORECTAL CANCER: ICD-10-CM

## 2022-02-22 DIAGNOSIS — Z12.5 SCREENING PSA (PROSTATE SPECIFIC ANTIGEN): ICD-10-CM

## 2022-02-22 DIAGNOSIS — Z00.00 PREVENTATIVE HEALTH CARE: ICD-10-CM

## 2022-02-22 DIAGNOSIS — L30.9 DERMATITIS: ICD-10-CM

## 2022-02-22 LAB
CHOLEST SERPL-MCNC: 212 MG/DL (ref 120–199)
CHOLEST/HDLC SERPL: 4.2 {RATIO} (ref 2–5)
COMPLEXED PSA SERPL-MCNC: 0.25 NG/ML (ref 0–4)
HDLC SERPL-MCNC: 51 MG/DL (ref 40–75)
HDLC SERPL: 24.1 % (ref 20–50)
LDLC SERPL CALC-MCNC: 139.2 MG/DL (ref 63–159)
NONHDLC SERPL-MCNC: 161 MG/DL
TRIGL SERPL-MCNC: 109 MG/DL (ref 30–150)

## 2022-02-22 PROCEDURE — 1160F PR REVIEW ALL MEDS BY PRESCRIBER/CLIN PHARMACIST DOCUMENTED: ICD-10-PCS | Mod: CPTII,S$GLB,, | Performed by: FAMILY MEDICINE

## 2022-02-22 PROCEDURE — 99214 PR OFFICE/OUTPT VISIT, EST, LEVL IV, 30-39 MIN: ICD-10-PCS | Mod: S$GLB,,, | Performed by: FAMILY MEDICINE

## 2022-02-22 PROCEDURE — 3288F PR FALLS RISK ASSESSMENT DOCUMENTED: ICD-10-PCS | Mod: CPTII,S$GLB,, | Performed by: FAMILY MEDICINE

## 2022-02-22 PROCEDURE — 99999 PR PBB SHADOW E&M-EST. PATIENT-LVL IV: CPT | Mod: PBBFAC,,, | Performed by: FAMILY MEDICINE

## 2022-02-22 PROCEDURE — 3074F SYST BP LT 130 MM HG: CPT | Mod: CPTII,S$GLB,, | Performed by: FAMILY MEDICINE

## 2022-02-22 PROCEDURE — 72100 X-RAY EXAM L-S SPINE 2/3 VWS: CPT | Mod: TC

## 2022-02-22 PROCEDURE — 84153 ASSAY OF PSA TOTAL: CPT | Performed by: FAMILY MEDICINE

## 2022-02-22 PROCEDURE — 99999 PR PBB SHADOW E&M-EST. PATIENT-LVL IV: ICD-10-PCS | Mod: PBBFAC,,, | Performed by: FAMILY MEDICINE

## 2022-02-22 PROCEDURE — 1160F RVW MEDS BY RX/DR IN RCRD: CPT | Mod: CPTII,S$GLB,, | Performed by: FAMILY MEDICINE

## 2022-02-22 PROCEDURE — 3008F BODY MASS INDEX DOCD: CPT | Mod: CPTII,S$GLB,, | Performed by: FAMILY MEDICINE

## 2022-02-22 PROCEDURE — 3288F FALL RISK ASSESSMENT DOCD: CPT | Mod: CPTII,S$GLB,, | Performed by: FAMILY MEDICINE

## 2022-02-22 PROCEDURE — 3079F PR MOST RECENT DIASTOLIC BLOOD PRESSURE 80-89 MM HG: ICD-10-PCS | Mod: CPTII,S$GLB,, | Performed by: FAMILY MEDICINE

## 2022-02-22 PROCEDURE — 99214 OFFICE O/P EST MOD 30 MIN: CPT | Mod: S$GLB,,, | Performed by: FAMILY MEDICINE

## 2022-02-22 PROCEDURE — 72100 X-RAY EXAM L-S SPINE 2/3 VWS: CPT | Mod: 26,,, | Performed by: RADIOLOGY

## 2022-02-22 PROCEDURE — 99499 RISK ADDL DX/OHS AUDIT: ICD-10-PCS | Mod: S$GLB,,, | Performed by: FAMILY MEDICINE

## 2022-02-22 PROCEDURE — 1159F MED LIST DOCD IN RCRD: CPT | Mod: CPTII,S$GLB,, | Performed by: FAMILY MEDICINE

## 2022-02-22 PROCEDURE — 99499 UNLISTED E&M SERVICE: CPT | Mod: S$GLB,,, | Performed by: FAMILY MEDICINE

## 2022-02-22 PROCEDURE — 3008F PR BODY MASS INDEX (BMI) DOCUMENTED: ICD-10-PCS | Mod: CPTII,S$GLB,, | Performed by: FAMILY MEDICINE

## 2022-02-22 PROCEDURE — 72100 XR LUMBAR SPINE 2 OR 3 VIEWS: ICD-10-PCS | Mod: 26,,, | Performed by: RADIOLOGY

## 2022-02-22 PROCEDURE — 3074F PR MOST RECENT SYSTOLIC BLOOD PRESSURE < 130 MM HG: ICD-10-PCS | Mod: CPTII,S$GLB,, | Performed by: FAMILY MEDICINE

## 2022-02-22 PROCEDURE — 1159F PR MEDICATION LIST DOCUMENTED IN MEDICAL RECORD: ICD-10-PCS | Mod: CPTII,S$GLB,, | Performed by: FAMILY MEDICINE

## 2022-02-22 PROCEDURE — 1101F PT FALLS ASSESS-DOCD LE1/YR: CPT | Mod: CPTII,S$GLB,, | Performed by: FAMILY MEDICINE

## 2022-02-22 PROCEDURE — 1101F PR PT FALLS ASSESS DOC 0-1 FALLS W/OUT INJ PAST YR: ICD-10-PCS | Mod: CPTII,S$GLB,, | Performed by: FAMILY MEDICINE

## 2022-02-22 PROCEDURE — 1125F PR PAIN SEVERITY QUANTIFIED, PAIN PRESENT: ICD-10-PCS | Mod: CPTII,S$GLB,, | Performed by: FAMILY MEDICINE

## 2022-02-22 PROCEDURE — 1125F AMNT PAIN NOTED PAIN PRSNT: CPT | Mod: CPTII,S$GLB,, | Performed by: FAMILY MEDICINE

## 2022-02-22 PROCEDURE — 80061 LIPID PANEL: CPT | Performed by: FAMILY MEDICINE

## 2022-02-22 PROCEDURE — 3079F DIAST BP 80-89 MM HG: CPT | Mod: CPTII,S$GLB,, | Performed by: FAMILY MEDICINE

## 2022-02-22 RX ORDER — CLOSTRIDIUM TETANI TOXOID ANTIGEN (FORMALDEHYDE INACTIVATED) AND CORYNEBACTERIUM DIPHTHERIAE TOXOID ANTIGEN (FORMALDEHYDE INACTIVATED) 5; 2 [LF]/.5ML; [LF]/.5ML
0.5 INJECTION, SUSPENSION INTRAMUSCULAR ONCE
Qty: 0.5 ML | Refills: 0 | Status: SHIPPED | OUTPATIENT
Start: 2022-02-22 | End: 2022-02-22

## 2022-02-22 RX ORDER — ACYCLOVIR 400 MG/1
400 TABLET ORAL DAILY
Qty: 90 TABLET | Refills: 3 | Status: SHIPPED | OUTPATIENT
Start: 2022-02-22 | End: 2022-04-20

## 2022-02-22 RX ORDER — DICLOFENAC SODIUM 10 MG/G
GEL TOPICAL
Qty: 200 G | Refills: 5 | Status: SHIPPED | OUTPATIENT
Start: 2022-02-22 | End: 2022-09-15

## 2022-02-22 NOTE — Clinical Note
Hi, Dr. Clifton.  I referred Chuy to you for second opinion on scalp rash. My staff will be scheduling his appointment.  Message or call me with any questions.  Thank you for your help caring for my patients!  -MARAL

## 2022-02-22 NOTE — ASSESSMENT & PLAN NOTE
> 1 year, getting worse, refractory to chiropractic care. He reports no fever, sweats, involuntary weight loss, loss of lower extremity strength or sensation, saddle anesthesia, or incontinence of urine or feces.  We discussed differential diagnosis and risks and benefits of treatment options.

## 2022-02-22 NOTE — PROGRESS NOTES
OFFICE VISIT 2/22/22  7:30 AM CST  LAST ENCOUNTER WITH ME:  11/11/2020   HEALTH MAINTENANCE INTERVENTIONS - UP TO DATE  Health Maintenance Topics with due status: Not Due       Topic Last Completion Date    Lipid Panel 11/11/2020     HEALTH MAINTENANCE INTERVENTIONS - DUE OR DUE SOON  Health Maintenance Due   Topic Date Due    COVID-19 Vaccine (1) Never done    Colorectal Cancer Screening  07/01/2020    Influenza Vaccine (1) 09/01/2021    PROSTATE-SPECIFIC ANTIGEN  11/11/2021    TETANUS VACCINE  01/01/2022     CHIEF COMPLAINT: Annual Exam and Follow-up    DIAGNOSES SPECIFICALLY EVALUATED AND TREATED THIS ENCOUNTER  1. Chronic left-sided low back pain without sciatica  - diclofenac sodium (VOLTAREN) 1 % Gel; Apply to painful joint area four times a day as needed  Dispense: 200 g; Refill: 5  - X-Ray Lumbar Spine 2 Or 3 Views; Future  - Ambulatory referral/consult to Physical/Occupational Therapy; Future    2. Simple chronic bronchitis    3. History of herpes simplex infection of genitourinary system  - acyclovir (ZOVIRAX) 400 MG tablet; Take 1 tablet (400 mg total) by mouth once daily.  Dispense: 90 tablet; Refill: 3    4. Screening for colorectal cancer  - Cologuard Screening (Multitarget Stool DNA); Future  - Cologuard Screening (Multitarget Stool DNA)    5. Preventative health care  - Cologuard Screening (Multitarget Stool DNA); Future  - Cologuard Screening (Multitarget Stool DNA)  - Lipid Panel  - PSA, Screening    6. Dermatitis of scalp  - Ambulatory referral/consult to Dermatology; Future    7. Dyslipidemia (mild, not requiring statin therapy)  - Lipid Panel    8. Screening PSA (prostate specific antigen)  - PSA, Screening    9. Need for Td vaccine  - tetanus and diphther. tox, PF, (TENIVAC, PF,) 5 Lf unit- 2 Lf unit/0.5mL Susp; Inject 0.5 mLs into the muscle once. (Pharmacist administer according to protocol.) for 1 dose  Dispense: 0.5 mL; Refill: 0    10. At increased risk for hospitalization and death  from COVID-19 infection due to unvaccinated state     Follow up in about 1 year (around 2/22/2023) for wellness and preventive services.    Problem List Items Addressed This Visit     Dermatitis of scalp (Chronic)    Relevant Orders    Ambulatory referral/consult to Dermatology    Simple chronic bronchitis (Chronic)    Current Assessment & Plan     Minimal symptoms. Clinically stable.           History of herpes simplex infection of genitourinary system    Current Assessment & Plan     Well controlled. He does NOT want to try episodic treatment.           Relevant Medications    acyclovir (ZOVIRAX) 400 MG tablet    Chronic left-sided low back pain without sciatica - Primary    Current Assessment & Plan     > 1 year, getting worse, refractory to chiropractic care. He reports no fever, sweats, involuntary weight loss, loss of lower extremity strength or sensation, saddle anesthesia, or incontinence of urine or feces.  We discussed differential diagnosis and risks and benefits of treatment options.             Relevant Medications    diclofenac sodium (VOLTAREN) 1 % Gel    Other Relevant Orders    X-Ray Lumbar Spine 2 Or 3 Views    Ambulatory referral/consult to Physical/Occupational Therapy    At increased risk for hospitalization and death from COVID-19 infection due to unvaccinated state AMA    Dyslipidemia (mild, not requiring statin therapy)    Relevant Orders    Lipid Panel      Other Visit Diagnoses     Screening for colorectal cancer        Relevant Orders    Cologuard Screening (Multitarget Stool DNA)    Preventative health care        Relevant Orders    Cologuard Screening (Multitarget Stool DNA)    Lipid Panel    PSA, Screening    Screening PSA (prostate specific antigen)        Relevant Orders    PSA, Screening    Need for Td vaccine        Relevant Medications    tetanus and diphther. tox, PF, (TENIVAC, PF,) 5 Lf unit- 2 Lf unit/0.5mL Susp      Unless noted herein, any chronic conditions are represented as  "and appear compensated/controlled and stable, and no other significant complaints or concerns were reported.    PRESCRIPTION DRUG MANAGEMENT  Outpatient Medications Prior to Visit   Medication Sig Dispense Refill    acyclovir (ZOVIRAX) 400 MG tablet TAKE 1 TABLET(400 MG) BY MOUTH EVERY DAY 90 tablet 0     No facility-administered medications prior to visit.     Medications Discontinued During This Encounter   Medication Reason    acyclovir (ZOVIRAX) 400 MG tablet Reorder     Medications Ordered This Encounter   Medications    acyclovir (ZOVIRAX) 400 MG tablet     Sig: Take 1 tablet (400 mg total) by mouth once daily.     Dispense:  90 tablet     Refill:  3    diclofenac sodium (VOLTAREN) 1 % Gel     Sig: Apply to painful joint area four times a day as needed     Dispense:  200 g     Refill:  5     If insurance does not cover, please offer OTC equivalent. OK to vary dispense quantity to correspond with in-stock OTC unit quantities.    tetanus and diphther. tox, PF, (TENIVAC, PF,) 5 Lf unit- 2 Lf unit/0.5mL Susp     Sig: Inject 0.5 mLs into the muscle once. (Pharmacist administer according to protocol.) for 1 dose     Dispense:  0.5 mL     Refill:  0   Review of Systems   Respiratory: Negative for chest tightness and shortness of breath.    Cardiovascular: Negative for chest pain.   Endocrine: Negative for polydipsia and polyuria.      PHYSICAL EXAM  Vitals:    02/22/22 0728   BP: 110/80   BP Location: Left arm   Patient Position: Sitting   BP Method: Medium (Manual)   Pulse: 88   SpO2: 96%   Weight: 65.9 kg (145 lb 4.5 oz)   Height: 5' 5" (1.651 m)   Physical Exam  Vitals reviewed.   Constitutional:       General: He is not in acute distress.     Appearance: Normal appearance. He is not ill-appearing or diaphoretic.   Cardiovascular:      Rate and Rhythm: Normal rate and regular rhythm.   Pulmonary:      Effort: Pulmonary effort is normal.      Breath sounds: Normal breath sounds.   Musculoskeletal:      Lumbar " "back: Normal.   Skin:     General: Skin is warm and dry.      Capillary Refill: Capillary refill takes less than 2 seconds.   Neurological:      General: No focal deficit present.      Mental Status: He is alert and oriented to person, place, and time. Mental status is at baseline.      Motor: No weakness.      Gait: Gait normal.      Deep Tendon Reflexes: Reflexes normal.   Psychiatric:         Mood and Affect: Mood normal.         Behavior: Behavior normal.         Judgment: Judgment normal.          Documentation entered by me for this encounter may have been done in part using speech-recognition technology. Although I have made an effort to ensure accuracy, "sound like" errors may exist and should be interpreted in context.   "

## 2022-02-22 NOTE — PATIENT INSTRUCTIONS
Colon cancer screening saves lives, so I've ordered a Cologuard colon cancer screening test kit to be mailed to you. Cologuard is a noninvasive colon cancer screening test for people 45 years of age and older at average risk for colon cancer. Cologuard tests for DNA in your stool that could indicate the presence of colon cancer or precancerous polyps. You will get a Cologuard kit delivered to your home soon. Please read and follow the instructions and return your stool specimen ASAP.  IMPORTANT: When you receive your Cologuard kit, it is crucial that you read and follow the instructions and return your stool specimen ASAP.   1 . Receive and unpack your Cologuard kit. Make a commitment to yourself to complete the test within one week.    2 . Place your sample container into the toilet bracket.   3 . Use your Cologuard kit to collect your sample.   4 . Scrape your sample, then fill the sample container with the preservative.   5 . Using the prepaid UPS label, ship the box back WITHIN 24 HOURS.             The COVID-19 vaccinations are helping prevent hospitalization and death from COVID-19. The great majority of people hospitalized with COVID-19 are either unvaccinated or not fully vaccinated. I strongly recommend you get your COVID-19.    Please learn more about the COVID-19 vaccine at [email or web address]. Afterward, please let me know if I can answer any questions you may have about the vaccine. I'll be glad to help.     Please take care of yourself. You are important to me.    The quickest and easiest way to schedule an appointment for your COVID-19 vaccination is from the [facility] or [email or web address] online at [email or web address]. You can also schedule an appointment for your COVID-19 vaccination by calling [phone].

## 2022-02-28 ENCOUNTER — CLINICAL SUPPORT (OUTPATIENT)
Dept: REHABILITATION | Facility: HOSPITAL | Age: 67
End: 2022-02-28
Attending: FAMILY MEDICINE
Payer: MEDICARE

## 2022-02-28 DIAGNOSIS — M62.89 ABNORMAL INCREASED MUSCLE TONE: ICD-10-CM

## 2022-02-28 DIAGNOSIS — M54.50 CHRONIC LEFT-SIDED LOW BACK PAIN WITHOUT SCIATICA: ICD-10-CM

## 2022-02-28 DIAGNOSIS — G89.29 CHRONIC LEFT-SIDED LOW BACK PAIN WITHOUT SCIATICA: ICD-10-CM

## 2022-02-28 DIAGNOSIS — Z74.09 DECREASED STRENGTH, ENDURANCE, AND MOBILITY: ICD-10-CM

## 2022-02-28 DIAGNOSIS — R68.89 DECREASED STRENGTH, ENDURANCE, AND MOBILITY: ICD-10-CM

## 2022-02-28 DIAGNOSIS — R53.1 DECREASED STRENGTH, ENDURANCE, AND MOBILITY: ICD-10-CM

## 2022-02-28 DIAGNOSIS — R29.3 POSTURAL IMBALANCE: ICD-10-CM

## 2022-02-28 PROCEDURE — 97110 THERAPEUTIC EXERCISES: CPT

## 2022-02-28 PROCEDURE — 97161 PT EVAL LOW COMPLEX 20 MIN: CPT

## 2022-02-28 NOTE — PLAN OF CARE
[facility]   Physical Therapy Initial Evaluation   Date: 2/28/2022   Name: Chuy Bucio  Clinic Number: [id number]    Therapy Diagnosis:   Encounter Diagnoses   Name Primary?    Chronic left-sided low back pain without sciatica     Postural imbalance     Abnormal increased muscle tone     Decreased strength, endurance, and mobility      Physician: MARISELA Marie MD    Physician Orders: PT Eval and Treat  Medical Diagnosis from Referral: Chronic left-sided low back pain without sciatica  Evaluation Date: 2/28/2022  Authorization Period Expiration: 3/14/2022  Plan of Care Expiration: 5/28/2022  Progress Note Due: 3/28/2022  Visit # / Visits authorized: 1/ 1   FOTO: 1/3    Precautions: Standard    Time In: 7:35AM  Time Out: 8:15AM  Total Appointment Time (timed & untimed codes): 40 minutes    SUBJECTIVE     Date of onset: 6 months ago  History of current condition - Chuy reports: to therapy for chronic low back pain which he has been battling for years. Patient reports that typically goes and gets an adjustment at the Chiropractor which resolves his symptoms quickly. Patient reports that he did not get relief with this episode of pain as he typically does and his Chiropractor feels that he likely needs Physical Therapy to get to the root of the problem. Patient reports that during this instance he was carrying a ladder on his right shoulder and completely through his back out. Patient recalls numerous instances of throwing his back out.    Patient reports that he is feeling pretty good today in regards to his low back pain, reporting a dull ache in his low back today. Patient reports that today might be one of his better days. Patient reports that he stretched this morning as he always does including double knee to chest, piriformis stretch and lower trunk rotations. Patient reports that normally his worst pain is when he gets up in the morning although sometimes wakes him up  in the middle of the night.      Falls: Patient reports no falls.     Imaging, X-Ray Lumbar Spine 2/22/2022: Reviewed in chart    Prior Therapy: Patient has had previous Physical Therapy before.   Social History: Patient lives with their spouse.   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 Patient also does some singing and guitars.   Prior Level of Function: Patient has chronic low back pain that has been less significant and more controlled with Chiropractic work until recently.   Current Level of Function: Patient has the most discomfort at night/early mornings requiring stretching although does not always provide him relief. Patient has increased pain when getting in weird positions required of him for pest control.    Pain:  Current 1/10, worst 6/10, best 0/10   Location: left low back which occasionally goes upwards towards thoracic spine  Description: Dull  Aggravating Factors: Nighttime/sleeping, lifting heavy objects, odd positioning with pest control job  Easing Factors: Ale[place], Chiropractor    Dominant Extremity: Right    Patients goals: is to reduce pain and to learn techniques to reduce recurrence of episodic flare ups.        Medical History:   Past Medical History:   Diagnosis Date    Allergy     Dermatitis of scalp 6/11/2019    Herpes simplex     Simple chronic bronchitis 6/11/2019       Surgical History:   Chuy Bucio  has a past surgical history that includes Tonsillectomy.    Medications:   Chuy has a current medication list which includes the following prescription(s): acyclovir and diclofenac sodium.    Allergies:   Review of patient's allergies indicates:   Allergen Reactions    Clindamycin hcl Other (See Comments)    Penicillin g potassium Other (See Comments)    Tetracyclines Other (See Comments)          OBJECTIVE     Sensation:  Sensation is intact to light  touch  Posture:  Pt presents with postural abnormalities which include: scapular winging  on right side  Palpation: Increased tone and tenderness noted with palpation of left latissimus dorsi , thoracic paraspinals, lumbar paraspinals , quadratus lumborum  and glutes although no tenderness present throughout thoracic paraspinals or latissimus dorsi. Increased tone noted with palpation of bilateral hamstrings , hip adductors  and gastrocnemius.   Movement Analysis: Patient requires increased time to perform bed mobility transitions.   Gait Analysis: The patient ambulated with the following assistive device: none; the pt presents with the following gait abnormalities: trendelenburg and left lateral trunk lean    (x = not tested due to pain and/or inability to obtain test position)    Range of Motion/Strength:     Thoracolumbar AROM  2/28/2022 Pain/Dysfunction with Movement Goal   Flexion (60) 60  Tightness in hamstrings No tightness   Extension (30) 25 No pain    Right side bending (25) 15 No pain 25    Left side bending (25) 12 Pain in left sided low back 25 No pain   Right rotation 40% limited Discomfort in low back 15% limited no pain   Left rotation 60% limited More discomfort in low back  15% limited no pain     Hip Right  2/28/2022 Left  2/28/2022 Pain/Dysfunction with Movement Goal   AROM       Flexion (120)  Full  Full No pain -   Extension (30)  10  10 Pain in low back on left side 25 No pain   Abduction (45)  35  30 Tightness in adductors B 45 No tightness   IR (45)  25  35 Pain in low back 40 No pain   ER (45)  45  65 No pain  -     Knee Right  2/28/2022 Left  2/28/2022   AROM     Flexion (135)  Full  Full   Extension (0)  0  0     L/E MMT Right Left Pain/Dysfunction with Movement Goal   Hip Flexion  4-/5 3+/5 No pain 4+/5 B   Hip Extension  4-/5 3+/5 Discomfort in low back 4+/5 B   Hip Abduction  4-/5 4-/5 Pain in right sided low back when testing on left 4+/5 B   Hip Adduction 4-/5 3+/5 No pain 4+/5 B    Hip IR 4-/5 3+/5 No pain 4+/5 B   Hip ER 4-/5 3+/5 No pain 4+/5 B     MUSCLE LENGTH:     Muscle Tested  Right  2/28/2022 Left   2/28/2022 Goal   Quadratus Lumborum decreased decreased Normal B   Adductors decreased decreased    Hamstrings  decreased decreased Normal B   Piriformis  decreased decreased Normal B   Gastrocnemius  decreased decreased Normal B     SPECIAL TESTS:     Right  2/28/2022 Left   2/28/2022 Goal   Slump Test Negative Negative Negative B      Joint Mobility  Joint Motion Tested Spine  2/28/2022 Goal   Lumbar P/A Glides Normal Normal B     Function:   CMS Impairment/Limitation/Restriction for FOTO Lumbar Survey    Therapist reviewed FOTO scores for Chuy Bucio on 2/28/2022.   FOTO documents entered into [employer] - see Media section.    Limitation Score: 6%         TREATMENT     Treatment Time In: 8:07AM  Treatment Time Out: 8:15AM  Total Treatment time (time-based codes) separate from Evaluation: 8 minutes    Chuy received therapeutic exercises to develop strength, endurance, ROM, flexibility, posture and core stabilization for 8 minutes including:  · Educated patient on the proper form and sequencing of all exercises provided in HEP today including extended time spent ensuring adequate abdominal engagement for abdominal bracing (See Patient Instructions for HEP breakdown)    PATIENT EDUCATION AND HOME EXERCISES     Home Exercises and Patient Education Provided    Education provided:    Patient educated on the impairments noted above and the effects of physical therapy intervention to improve overall condition and QOL.    Patient was educated on all the above exercise prior/during/after for proper posture, positioning, and execution for safe performance with home exercise program.    Patient educated on the use of pillows to aid in neutral alignment of spine and extremities when sleeping in supine or side lying including modification of arm positioning to assist with decreasing neck pain.     Patient educated on proper ergonomics at the work station in order to maintain optimal alignment of the musculoskeletal system and improve efficiency in the work environment.   Patient educated on the importance of improved core and lower extremity strength in order to improve alignment of the spine and lower extremities with static positions and dynamic movement.    Patient educated on the importance of strong core and lower extremity musculature in order to improve both static and dynamic balance, improve gait mechanics, reduce fall risk and improve household and community mobility.     Written Home Exercises Provided: Patient instructed to cont prior HEP. Exercises were reviewed and Chuy was able to demonstrate them prior to the end of the session.  Chuy demonstrated good  understanding of the education provided. See EMR under Patient Instructions for exercises provided during therapy sessions.      ASSESSMENT     Chuy is a 66 y.o. male referred to outpatient Physical Therapy with a medical diagnosis of Chronic left-sided low back pain without sciatica. 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     Pt prognosis is Good.   Pt will benefit from skilled outpatient Physical Therapy to address the deficits stated above and in the chart below, provide pt/family education, and to maximize pt's level of independence.     Plan of care discussed with patient: Yes  Pt's spiritual, cultural and educational needs considered and patient is agreeable to the plan of care and goals as stated below:     Anticipated Barriers for therapy: chronicity of condition    Medical Necessity is demonstrated by the  following  History  Co-morbidities and personal factors that may impact the plan of care Co-morbidities:   chronic low back pain    Personal Factors:   social background  lifestyle     low   Examination  Body Structures and Functions, activity limitations and participation restrictions that may impact the plan of care Body Regions:   back  lower extremities  trunk    Body Systems:    gross symmetry  ROM  strength  gross coordinated movement  balance  gait  transfers  transitions  motor control  motor learning    Participation Restrictions:   Impaired ability to perform the above described tasks    Activity limitations:   Learning and applying knowledge  no deficits    General Tasks and Commands  no deficits    Communication  no deficits    Mobility  lifting and carrying objects  walking  awkward positioning requiring of him for work    Self care  no deficits    Domestic Life  shopping  cooking  doing house work (cleaning house, washing dishes, laundry)  assisting others    Interactions/Relationships  no deficits    Life Areas  no deficits    Community and Social Life  community life  recreation and leisure         moderate   Clinical Presentation stable and uncomplicated low   Decision Making/ Complexity Score: low     Goals:  Short Term Goals:  6 weeks 2/28/2022   1. Pain: Pt will demonstrate improved pain by reports of less than or equal to 3/10 worst pain on the verbal rating scale in order to progress toward maximal functional ability and improve QOL.    2. Mobility: Patient will improve AROM to 50% of stated goals, listed in objective measures above, in order to progress towards independence with functional activities.     3. Strength: Patient will improve strength to 4/5 in all muscle groups, listed in objective measures above, in order to progress towards independence with functional activities.     4. HEP: Patient will demonstrate independence with HEP in order to progress toward functional independence.     5. Patient will report undisturbed sleep secondary to improving low back pain to note improving overall quality of life and return to prior level of function.       Long Term Goals:  12 weeks 2/28/2022   1. Pain: Pt will demonstrate improved pain by reports of less than or equal to 1/10 worst pain on the verbal rating scale in order to progress toward maximal functional ability and improve QOL.      2. Mobility: Patient will improve AROM to stated goals, listed in objective measures above, in order to return to maximal functional potential and improve quality of life.    3. Strength: Patient will improve strength to stated goals, listed in objective measures above, in order to improve functional independence and quality of life.    4. Gait: Patient will demonstrate normalized gait mechanics with minimal compensation in order to return to PLOF.    5. Patient will return to normal ADL's, IADL's, recreational activities, and work-related activities with less than or equal to 1/10 pain and maximal function.     6. Patient will report 1/10 pain or less for 2 consecutive weeks upon waking up noting adequate muscle tone, strength and endurance to decrease recurrence of flare ups.       PLAN   Plan of care Certification: 2/28/2022 to 5/28/2022.    Outpatient Physical Therapy 2 times weekly for 12 weeks to include any combination of the following interventions: Virtual Therapy, Dry Needling, modalities, electrical stimulation (IFC, Pre-Mod, Attended with Functional Dry Needling), Aquatic Therapy, Cervical/Lumbar Traction, Electrical Stimulation unattended/attended, Gait Training, Manual Therapy, Moist Heat/ Ice, Neuromuscular Re-ed, Patient Education, Self Care, Therapeutic Activities and Therapeutic Exercise    Thank you for this referral.    These services are reasonable and necessary for the conditions set forth above while under my care.    I CERTIFY THE NEED FOR THESE SERVICES FURNISHED UNDER THIS PLAN OF TREATMENT  AND WHILE UNDER MY CARE   Physician's comments:     Physician's Signature: ___________________________________________________         Kari Talbot PT, [email or web address]

## 2022-03-08 ENCOUNTER — PATIENT OUTREACH (OUTPATIENT)
Dept: ADMINISTRATIVE | Facility: OTHER | Age: 67
End: 2022-03-08
Payer: MEDICARE

## 2022-03-09 ENCOUNTER — OFFICE VISIT (OUTPATIENT)
Dept: DERMATOLOGY | Facility: CLINIC | Age: 67
End: 2022-03-09
Payer: MEDICARE

## 2022-03-09 DIAGNOSIS — L30.9 DERMATITIS: ICD-10-CM

## 2022-03-09 DIAGNOSIS — L21.9 SEBORRHEIC DERMATITIS: Primary | ICD-10-CM

## 2022-03-09 PROCEDURE — 99999 PR PBB SHADOW E&M-EST. PATIENT-LVL III: ICD-10-PCS | Mod: PBBFAC,,, | Performed by: STUDENT IN AN ORGANIZED HEALTH CARE EDUCATION/TRAINING PROGRAM

## 2022-03-09 PROCEDURE — 1159F MED LIST DOCD IN RCRD: CPT | Mod: CPTII,S$GLB,, | Performed by: STUDENT IN AN ORGANIZED HEALTH CARE EDUCATION/TRAINING PROGRAM

## 2022-03-09 PROCEDURE — 99214 OFFICE O/P EST MOD 30 MIN: CPT | Mod: S$GLB,,, | Performed by: STUDENT IN AN ORGANIZED HEALTH CARE EDUCATION/TRAINING PROGRAM

## 2022-03-09 PROCEDURE — 99214 PR OFFICE/OUTPT VISIT, EST, LEVL IV, 30-39 MIN: ICD-10-PCS | Mod: S$GLB,,, | Performed by: STUDENT IN AN ORGANIZED HEALTH CARE EDUCATION/TRAINING PROGRAM

## 2022-03-09 PROCEDURE — 1160F PR REVIEW ALL MEDS BY PRESCRIBER/CLIN PHARMACIST DOCUMENTED: ICD-10-PCS | Mod: CPTII,S$GLB,, | Performed by: STUDENT IN AN ORGANIZED HEALTH CARE EDUCATION/TRAINING PROGRAM

## 2022-03-09 PROCEDURE — 99999 PR PBB SHADOW E&M-EST. PATIENT-LVL III: CPT | Mod: PBBFAC,,, | Performed by: STUDENT IN AN ORGANIZED HEALTH CARE EDUCATION/TRAINING PROGRAM

## 2022-03-09 PROCEDURE — 1159F PR MEDICATION LIST DOCUMENTED IN MEDICAL RECORD: ICD-10-PCS | Mod: CPTII,S$GLB,, | Performed by: STUDENT IN AN ORGANIZED HEALTH CARE EDUCATION/TRAINING PROGRAM

## 2022-03-09 PROCEDURE — 1160F RVW MEDS BY RX/DR IN RCRD: CPT | Mod: CPTII,S$GLB,, | Performed by: STUDENT IN AN ORGANIZED HEALTH CARE EDUCATION/TRAINING PROGRAM

## 2022-03-09 RX ORDER — KETOCONAZOLE 20 MG/ML
SHAMPOO, SUSPENSION TOPICAL
Qty: 120 ML | Refills: 3 | Status: SHIPPED | OUTPATIENT
Start: 2022-03-10 | End: 2022-09-15

## 2022-03-09 RX ORDER — FLUOCINONIDE TOPICAL SOLUTION USP, 0.05% 0.5 MG/ML
SOLUTION TOPICAL DAILY
Qty: 60 ML | Refills: 2 | Status: SHIPPED | OUTPATIENT
Start: 2022-03-09 | End: 2022-09-15

## 2022-03-09 NOTE — PROGRESS NOTES
Subjective:       Patient ID:  Chuy Bucio is a 66 y.o. male who presents for   Chief Complaint   Patient presents with    Dermatitis     History of Present Illness: The patient presents for follow up of scalp irritation. Last seen on 2020 by Dr. Mcdowell. Patient reports having continued flaking, itching, and irritated crusted bumps in the scalp, and occasionally will develop similar symptoms in the eyebrows. Patient currently using OTC psoriasis shampoos with minimal improvement in symptoms.       Review of Systems   Constitutional: Negative for fever and chills.        Objective:    Physical Exam   Constitutional: He appears well-developed and well-nourished. No distress.   Neurological: He is alert and oriented to person, place, and time. He is not disoriented.   Psychiatric: He has a normal mood and affect.   Skin:   Areas Examined (abnormalities noted in diagram):   Scalp / Hair Palpated and Inspected  Head / Face Inspection Performed  Neck Inspection Performed  Nails and Digits Inspection Performed              Diagram Legend     Erythematous scaling macule/papule c/w actinic keratosis       Vascular papule c/w angioma      Pigmented verrucoid papule/plaque c/w seborrheic keratosis      Yellow umbilicated papule c/w sebaceous hyperplasia      Irregularly shaped tan macule c/w lentigo     1-2 mm smooth white papules consistent with Milia      Movable subcutaneous cyst with punctum c/w epidermal inclusion cyst      Subcutaneous movable cyst c/w pilar cyst      Firm pink to brown papule c/w dermatofibroma      Pedunculated fleshy papule(s) c/w skin tag(s)      Evenly pigmented macule c/w junctional nevus     Mildly variegated pigmented, slightly irregular-bordered macule c/w mildly atypical nevus      Flesh colored to evenly pigmented papule c/w intradermal nevus       Pink pearly papule/plaque c/w basal cell carcinoma      Erythematous hyperkeratotic cursted plaque c/w SCC      Surgical scar with no sign of  skin cancer recurrence      Open and closed comedones      Inflammatory papules and pustules      Verrucoid papule consistent consistent with wart     Erythematous eczematous patches and plaques     Dystrophic onycholytic nail with subungual debris c/w onychomycosis     Umbilicated papule    Erythematous-base heme-crusted tan verrucoid plaque consistent with inflamed seborrheic keratosis     Erythematous Silvery Scaling Plaque c/w Psoriasis     See annotation      Assessment / Plan:        Seborrheic dermatitis -   -     ketoconazole (NIZORAL) 2 % shampoo; Apply topically twice a week.  Dispense: 120 mL; Refill: 3  -     fluocinonide (LIDEX) 0.05 % external solution; Apply topically once daily. Apply to the scalp.  Dispense: 60 mL; Refill: 2             Follow up in about 8 weeks (around 5/4/2022).

## 2022-03-11 ENCOUNTER — DOCUMENTATION ONLY (OUTPATIENT)
Dept: REHABILITATION | Facility: HOSPITAL | Age: 67
End: 2022-03-11
Payer: MEDICARE

## 2022-03-11 ENCOUNTER — CLINICAL SUPPORT (OUTPATIENT)
Dept: REHABILITATION | Facility: HOSPITAL | Age: 67
End: 2022-03-11
Payer: MEDICARE

## 2022-03-11 DIAGNOSIS — M62.89 ABNORMAL INCREASED MUSCLE TONE: ICD-10-CM

## 2022-03-11 DIAGNOSIS — R68.89 DECREASED STRENGTH, ENDURANCE, AND MOBILITY: ICD-10-CM

## 2022-03-11 DIAGNOSIS — R53.1 DECREASED STRENGTH, ENDURANCE, AND MOBILITY: ICD-10-CM

## 2022-03-11 DIAGNOSIS — Z74.09 DECREASED STRENGTH, ENDURANCE, AND MOBILITY: ICD-10-CM

## 2022-03-11 DIAGNOSIS — R29.3 POSTURAL IMBALANCE: Primary | ICD-10-CM

## 2022-03-11 PROCEDURE — 97110 THERAPEUTIC EXERCISES: CPT | Mod: CQ

## 2022-03-11 NOTE — PROGRESS NOTES
PT/PTA met face to face to discuss pt's treatment plan and progress towards established goals. Pt will be seen by a physical therapist minimally every 6th visit or every 30 days.      Arian Benz PTA

## 2022-03-11 NOTE — PROGRESS NOTES
"  [facility]   Physical Therapy Treatment Note     Name: Chuy Bucio  Clinic Number: [id number]    Therapy Diagnosis:   Encounter Diagnoses   Name Primary?    Postural imbalance Yes    Abnormal increased muscle tone     Decreased strength, endurance, and mobility      Physician: MARISELA Marie MD    Visit Date: 3/11/2022      Physician Orders: PT Eval and Treat  Medical Diagnosis from Referral: Chronic left-sided low back pain without sciatica  Evaluation Date: 2/28/2022  Authorization Period Expiration: 3/14/2022  Plan of Care Expiration: 5/28/2022  Progress Note Due: 3/28/2022  Visit # / Visits authorized: 1/5   FOTO: 1/3     Precautions: Standard    PTA Visit #: 1/5     Time In: 0725  Time Out: 0810  Total Billable Time: 43 minutes (Billing reflects 1 on 1 treatment time spent with patient)     SUBJECTIVE     Patient reports: low grade pain in left low back. 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      He was compliant with home exercise program.    Response to previous treatment: unchanged     Functional change: guarded movements at times when he feels that his back is "about to go out"    Pain: 3/10     Location: left low back    OBJECTIVE     Objective Measures updated at progress report unless specified.       TREATMENT     Chuy received the treatments listed below:     THERAPEUTIC EXERCISES to develop strength, endurance, ROM, flexibility, posture and core stabilization for (43) minutes including:    Intervention Performed Today    Nustep     Exercise bike     Piriformis stretch x 30 seconds bialteral   Hamstring stretch 90/90 x 30 seconds bialteral   Bridges  x X 20   Transverse abdominis contraction x 2 minutes   March in place with Transverse abdominis contraction     Sitting ball roll outs x 10 x 10 second hold 3 directions                              Plan for Next Visit:          PATIENT EDUCATION AND HOME " EXERCISES     Home Exercises Provided and Patient Education Provided     Education provided: (during session) minutes   Patient educated on biomechanical justification for therapeutic exercise and importance of compliance with HEP in order to improve overall impairments and QOL    Patient was educated on all the above exercise prior/during/after for proper posture, positioning, and execution for safe performance with home exercise program.     Written Home Exercises Provided: yes.  Exercises were reviewed and Chuy was able to demonstrate them prior to the end of the session.  Chuy demonstrated good  understanding of the education provided. See EMR under Patient Instructions for exercises provided during therapy sessions.      ASSESSMENT     Improved exercise quality after instructions for transverse abdominis contractions and demonstrated an improvement in range of motion with quadratus lumborum stretch. Added exercises with instructions to maintain core stability and normal breathing pattern. Reports feeling more stretched out after this session in his shoulders but not in his low back.     Chuy is progressing well towards his goals.   Pt prognosis is Good.     Pt will continue to benefit from skilled outpatient physical therapy to address the deficits listed in the problem list box on initial evaluation, provide pt/family education and to maximize pt's level of independence in the home and community environment.     Pt's spiritual, cultural and educational needs considered and pt agreeable to plan of care and goals.       Anticipated Barriers for therapy: chronicity of condition      Goals:  Short Term Goals:  6 weeks 2/28/2022   1. Pain: Pt will demonstrate improved pain by reports of less than or equal to 3/10 worst pain on the verbal rating scale in order to progress toward maximal functional ability and improve quality of life .     2. Mobility: Patient will improve active range of motion  to 50% of  stated goals, listed in objective measures above, in order to progress towards independence with functional activities.      3. Strength: Patient will improve strength to 4/5 in all muscle groups, listed in objective measures above, in order to progress towards independence with functional activities.      4. Home exercise program : Patient will demonstrate independence with home exercise program  in order to progress toward functional independence.     5. Patient will report undisturbed sleep secondary to improving low back pain to note improving overall quality of life and return to prior level of function.         Long Term Goals:  12 weeks 2/28/2022   1. Pain: Pt will demonstrate improved pain by reports of less than or equal to 1/10 worst pain on the verbal rating scale in order to progress toward maximal functional ability and improve quality of life .       2. Mobility: Patient will improve AROM to stated goals, listed in objective measures above, in order to return to maximal functional potential and improve quality of life.     3. Strength: Patient will improve strength to stated goals, listed in objective measures above, in order to improve functional independence and quality of life.     4. Gait: Patient will demonstrate normalized gait mechanics with minimal compensation in order to return to prior level of function .     5. Patient will return to normal activties of daily living 's, IADL's, recreational activities, and work-related activities with less than or equal to 1/10 pain and maximal function.      6. Patient will report 1/10 pain or less for 2 consecutive weeks upon waking up noting adequate muscle tone, strength and endurance to decrease recurrence of flare ups.            Goals Key:  PC= progressing/continue; PM= partially met;        DC= discontinue    PLAN     Continue Plan of Care  and progress per patient tolerance. See treatment section for details on planned progressions next  session.      Arian Benz, PTA

## 2022-03-14 ENCOUNTER — CLINICAL SUPPORT (OUTPATIENT)
Dept: REHABILITATION | Facility: HOSPITAL | Age: 67
End: 2022-03-14
Payer: MEDICARE

## 2022-03-14 DIAGNOSIS — R68.89 DECREASED STRENGTH, ENDURANCE, AND MOBILITY: ICD-10-CM

## 2022-03-14 DIAGNOSIS — R53.1 DECREASED STRENGTH, ENDURANCE, AND MOBILITY: ICD-10-CM

## 2022-03-14 DIAGNOSIS — R29.3 POSTURAL IMBALANCE: Primary | ICD-10-CM

## 2022-03-14 DIAGNOSIS — M62.89 ABNORMAL INCREASED MUSCLE TONE: ICD-10-CM

## 2022-03-14 DIAGNOSIS — Z74.09 DECREASED STRENGTH, ENDURANCE, AND MOBILITY: ICD-10-CM

## 2022-03-14 PROCEDURE — 97110 THERAPEUTIC EXERCISES: CPT | Mod: CQ

## 2022-03-14 PROCEDURE — 97140 MANUAL THERAPY 1/> REGIONS: CPT | Mod: CQ

## 2022-03-14 NOTE — PROGRESS NOTES
"  [facility]   Physical Therapy Treatment Note     Name: Chuy Bucio  Clinic Number: [id number]    Therapy Diagnosis:   Encounter Diagnoses   Name Primary?    Postural imbalance Yes    Abnormal increased muscle tone     Decreased strength, endurance, and mobility      Physician: MARISELA Marie MD    Visit Date: 3/14/2022      Physician Orders: PT Eval and Treat  Medical Diagnosis from Referral: Chronic left-sided low back pain without sciatica  Evaluation Date: 2/28/2022  Authorization Period Expiration: 3/14/2022  Plan of Care Expiration: 5/28/2022  Progress Note Due: 3/28/2022  Visit # / Visits authorized: 2/5   FOTO: 1/3     Precautions: Standard    PTA Visit #: 2/5     Time In: 0700  Time Out: 0745  Total Billable Time: 45 minutes (Billing reflects 1 on 1 treatment time spent with patient)     SUBJECTIVE     Patient reports: no significant increase/decrease in pain over the weekend. Nothing seems to change his symptom with the exception of him getting his back manipulated by chiropractor. More pain in his shoulder blades compared to his low back.      He was not compliant with home exercise program.    Response to previous treatment: shoulders felt great but no change in his low back     Functional change: guarded movements at times when he feels that his back is "about to go out"    Pain: 1/10     Location: left low back    OBJECTIVE     Objective Measures updated at progress report unless specified.       TREATMENT     Chuy received the treatments listed below:       MANUAL THERAPY TECHNIQUES were applied for (10) minutes, including:    Manual Intervention Performed Today    Soft Tissue Mobilization x bilateral thoracic paraspinals, lumbar paraspinals  and quadratus lumborum    Joint Mobilizations     Mobilization with movement          Functional Dry Needling        Plan for Next Visit:      THERAPEUTIC EXERCISES to develop strength, endurance, ROM, flexibility, " posture and core stabilization for (35) minutes including:    Intervention Performed Today    Nustep x 5 minutes level 6 (added)   Exercise bike     Piriformis stretch  30 seconds bialteral   Hamstring stretch 90/90  30 seconds bialteral   Bridges   X 20   Transverse abdominis contraction x 3 minutes 5 minutes   March in place with Transverse abdominis contraction x 3 minutes   Sitting ball roll outs  10 x 10 second hold 3 directions   clams x 3 x 10 yellow band bilateral    Prone shoulder flexion with hip extension x 2 x 10 bilateral   Quadruped    shoulder flexion   hip extension   shoulder and hip combinations   Open book x 6 x 5 second hold          Plan for Next Visit:        PATIENT EDUCATION AND HOME EXERCISES     Home Exercises Provided and Patient Education Provided     Education provided: (during session) minutes   Patient educated on biomechanical justification for therapeutic exercise and importance of compliance with home exercise program  in order to improve overall impairments and quality of life    Patient was educated on all the above exercise prior/during/after for proper posture, positioning, and execution for safe performance with home exercise program.     Written Home Exercises Provided: yes.  Exercises were reviewed and Chuy was able to demonstrate them prior to the end of the session.  Chuy demonstrated good  understanding of the education provided. See electronic medical record  under Patient Instructions for exercises provided during therapy sessions.      ASSESSMENT     Progressed exercise today with patient verbalizing no fatigue but with progression of each exercise, fatigue was noted. Patient appears to not realize his limitations regarding core and hip strength.     Chuy is progressing well towards his goals.   Pt prognosis is Good.     Pt will continue to benefit from skilled outpatient physical therapy to address the deficits listed in the problem list box on initial  evaluation, provide pt/family education and to maximize pt's level of independence in the home and community environment.     Pt's spiritual, cultural and educational needs considered and pt agreeable to plan of care and goals.       Anticipated Barriers for therapy: chronicity of condition      Goals:  Short Term Goals:  6 weeks 2/28/2022   1. Pain: Pt will demonstrate improved pain by reports of less than or equal to 3/10 worst pain on the verbal rating scale in order to progress toward maximal functional ability and improve quality of life .     2. Mobility: Patient will improve active range of motion  to 50% of stated goals, listed in objective measures above, in order to progress towards independence with functional activities.      3. Strength: Patient will improve strength to 4/5 in all muscle groups, listed in objective measures above, in order to progress towards independence with functional activities.      4. Home exercise program : Patient will demonstrate independence with home exercise program  in order to progress toward functional independence.     5. Patient will report undisturbed sleep secondary to improving low back pain to note improving overall quality of life and return to prior level of function.         Long Term Goals:  12 weeks 2/28/2022   1. Pain: Pt will demonstrate improved pain by reports of less than or equal to 1/10 worst pain on the verbal rating scale in order to progress toward maximal functional ability and improve quality of life .       2. Mobility: Patient will improve AROM to stated goals, listed in objective measures above, in order to return to maximal functional potential and improve quality of life.     3. Strength: Patient will improve strength to stated goals, listed in objective measures above, in order to improve functional independence and quality of life.     4. Gait: Patient will demonstrate normalized gait mechanics with minimal compensation in order to return to  prior level of function .     5. Patient will return to normal activties of daily living 's, IADL's, recreational activities, and work-related activities with less than or equal to 1/10 pain and maximal function.      6. Patient will report 1/10 pain or less for 2 consecutive weeks upon waking up noting adequate muscle tone, strength and endurance to decrease recurrence of flare ups.            Goals Key:  PC= progressing/continue; PM= partially met;        DC= discontinue    PLAN     Continue Plan of Care  and progress per patient tolerance. See treatment section for details on planned progressions next session.      Arian Benz, PTA

## 2022-03-18 ENCOUNTER — CLINICAL SUPPORT (OUTPATIENT)
Dept: REHABILITATION | Facility: HOSPITAL | Age: 67
End: 2022-03-18
Payer: MEDICARE

## 2022-03-18 DIAGNOSIS — Z74.09 DECREASED STRENGTH, ENDURANCE, AND MOBILITY: ICD-10-CM

## 2022-03-18 DIAGNOSIS — M62.89 ABNORMAL INCREASED MUSCLE TONE: ICD-10-CM

## 2022-03-18 DIAGNOSIS — R53.1 DECREASED STRENGTH, ENDURANCE, AND MOBILITY: ICD-10-CM

## 2022-03-18 DIAGNOSIS — R68.89 DECREASED STRENGTH, ENDURANCE, AND MOBILITY: ICD-10-CM

## 2022-03-18 DIAGNOSIS — R29.3 POSTURAL IMBALANCE: Primary | ICD-10-CM

## 2022-03-18 PROCEDURE — 97140 MANUAL THERAPY 1/> REGIONS: CPT

## 2022-03-18 PROCEDURE — 97110 THERAPEUTIC EXERCISES: CPT

## 2022-03-18 NOTE — PROGRESS NOTES
"  [facility]   Physical Therapy Treatment Note     Name: Chuy Bucio  Clinic Number: [id number]    Therapy Diagnosis:   Encounter Diagnoses   Name Primary?    Postural imbalance Yes    Abnormal increased muscle tone     Decreased strength, endurance, and mobility      Physician: MARISELA Marie MD    Visit Date: 3/18/2022      Physician Orders: PT Eval and Treat  Medical Diagnosis from Referral: Chronic left-sided low back pain without sciatica  Evaluation Date: 2/28/2022  Authorization Period Expiration: 3/14/2022  Plan of Care Expiration: 5/28/2022  Progress Note Due: 3/28/2022  Visit # / Visits authorized: 3/5   FOTO: 1/3     Precautions: Standard    PTA Visit #: 0/5     Time In: 0746  Time Out: 0830  Total Billable Time: 44 minutes (Billing reflects 1 on 1 treatment time spent with patient)     SUBJECTIVE     Patient reports: that he is feeling better today. Patient reports that his exercises have been going well at home.     He was compliant with home exercise program.    Response to previous treatment: felt relief in his low back     Functional change: guarded movements at times when he feels that his back is "about to go out"    Pain: 1/10     Location: left low back    OBJECTIVE     Objective Measures updated at progress report unless specified.     TREATMENT     Chuy received the treatments listed below:     MANUAL THERAPY TECHNIQUES were applied for (15) minutes, including:    Manual Intervention Performed Today    Soft Tissue Mobilization x bilateral thoracic paraspinals, lumbar paraspinals  and quadratus lumborum    Joint Mobilizations     Mobilization with movement          Functional Dry Needling        Plan for Next Visit:      THERAPEUTIC EXERCISES to develop strength, endurance, ROM, flexibility, posture and core stabilization for (29) minutes including:    Intervention Performed Today    Nustep  5 minutes level 6 (added)   Exercise bike x 5 minutes level 3  "   Piriformis stretch  30 seconds bialteral   Hamstring stretch 90/90  30 seconds bialteral   Bridges from [employer] (progressed to incorporate ball) x 2x12 reps    Transverse abdominis contraction  3 minutes 5 minutes   March in place with Transverse abdominis contraction  3 minutes   Sitting ball roll outs  10 x 10 second hold 3 directions   clams x 2 x 10 green band bilateral    Prone shoulder flexion with contralateral hip extension x 2 x 10 bilateral   Bird Dogs     x  shoulder flexion   hip extension  Full combination 2x10 reps (initial instability with cueing provided)   Open book X  x 8 reps x 5 second hold  x10 reps green theraband (added)   Quadratus Lumborum Stretch in Standing (added) x x30 second holds B (more of a stretch experienced on left after performing on right first)     Plan for Next Visit:        PATIENT EDUCATION AND HOME EXERCISES     Home Exercises Provided and Patient Education Provided     Education provided: (during session) minutes   Patient educated on biomechanical justification for therapeutic exercise and importance of compliance with home exercise program  in order to improve overall impairments and quality of life    Patient was educated on all the above exercise prior/during/after for proper posture, positioning, and execution for safe performance with home exercise program.     Written Home Exercises Provided: yes.  Exercises were reviewed and Chuy was able to demonstrate them prior to the end of the session.  Chuy demonstrated good  understanding of the education provided. See electronic medical record  under Patient Instructions for exercises provided during therapy sessions.      ASSESSMENT     Progressed most exercises this session to incorporate additional resistance and less stable surface with good muscular fatigue reported. Patient noted with initial instability with hip rotation and decreased core activation noted with bird dogs which improved with cueing and  stabilization. Patient reported feeling good without any pain present upon exiting session today.     Chuy is progressing well towards his goals.   Pt prognosis is Good.     Pt will continue to benefit from skilled outpatient physical therapy to address the deficits listed in the problem list box on initial evaluation, provide pt/family education and to maximize pt's level of independence in the home and community environment.     Pt's spiritual, cultural and educational needs considered and pt agreeable to plan of care and goals.       Anticipated Barriers for therapy: chronicity of condition      Goals:  Short Term Goals:  6 weeks 2/28/2022   1. Pain: Pt will demonstrate improved pain by reports of less than or equal to 3/10 worst pain on the verbal rating scale in order to progress toward maximal functional ability and improve quality of life .     2. Mobility: Patient will improve active range of motion  to 50% of stated goals, listed in objective measures above, in order to progress towards independence with functional activities.      3. Strength: Patient will improve strength to 4/5 in all muscle groups, listed in objective measures above, in order to progress towards independence with functional activities.      4. Home exercise program : Patient will demonstrate independence with home exercise program  in order to progress toward functional independence.     5. Patient will report undisturbed sleep secondary to improving low back pain to note improving overall quality of life and return to prior level of function.         Long Term Goals:  12 weeks 2/28/2022   1. Pain: Pt will demonstrate improved pain by reports of less than or equal to 1/10 worst pain on the verbal rating scale in order to progress toward maximal functional ability and improve quality of life .       2. Mobility: Patient will improve AROM to stated goals, listed in objective measures above, in order to return to maximal functional  potential and improve quality of life.     3. Strength: Patient will improve strength to stated goals, listed in objective measures above, in order to improve functional independence and quality of life.     4. Gait: Patient will demonstrate normalized gait mechanics with minimal compensation in order to return to prior level of function .     5. Patient will return to normal activties of daily living 's, IADL's, recreational activities, and work-related activities with less than or equal to 1/10 pain and maximal function.      6. Patient will report 1/10 pain or less for 2 consecutive weeks upon waking up noting adequate muscle tone, strength and endurance to decrease recurrence of flare ups.            Goals Key:  PC= progressing/continue; PM= partially met;        DC= discontinue    PLAN     Continue Plan of Care  and progress per patient tolerance. See treatment section for details on planned progressions next session.      Kari Talbot, PT, DPT

## 2022-03-22 ENCOUNTER — CLINICAL SUPPORT (OUTPATIENT)
Dept: REHABILITATION | Facility: HOSPITAL | Age: 67
End: 2022-03-22
Payer: MEDICARE

## 2022-03-22 DIAGNOSIS — Z74.09 DECREASED STRENGTH, ENDURANCE, AND MOBILITY: ICD-10-CM

## 2022-03-22 DIAGNOSIS — M62.89 ABNORMAL INCREASED MUSCLE TONE: ICD-10-CM

## 2022-03-22 DIAGNOSIS — R29.3 POSTURAL IMBALANCE: Primary | ICD-10-CM

## 2022-03-22 DIAGNOSIS — R68.89 DECREASED STRENGTH, ENDURANCE, AND MOBILITY: ICD-10-CM

## 2022-03-22 DIAGNOSIS — R53.1 DECREASED STRENGTH, ENDURANCE, AND MOBILITY: ICD-10-CM

## 2022-03-22 PROCEDURE — 97110 THERAPEUTIC EXERCISES: CPT | Mod: CQ

## 2022-03-22 NOTE — PROGRESS NOTES
"  [facility]   Physical Therapy Treatment Note     Name: Chuy Bucio  Clinic Number: [id number]    Therapy Diagnosis:   Encounter Diagnoses   Name Primary?    Postural imbalance Yes    Abnormal increased muscle tone     Decreased strength, endurance, and mobility      Physician: MARISELA Marie MD    Visit Date: 3/22/2022      Physician Orders: PT Eval and Treat  Medical Diagnosis from Referral: Chronic left-sided low back pain without sciatica  Evaluation Date: 2/28/2022  Authorization Period Expiration: 3/14/2022  Plan of Care Expiration: 5/28/2022  Progress Note Due: 3/28/2022  Visit # / Visits authorized: 4/5   FOTO: 1/3     Precautions: Standard    PTA Visit #: 1/5     Time In: 0730  Time Out: 0815  Total Billable Time: 40 minutes (Billing reflects 1 on 1 treatment time spent with patient)     SUBJECTIVE     Patient reports: that he is feeling that his pain is more dull as compared to before therapy. Symptom Is not radiating into either lower extremity.      He was compliant with home exercise program.    Response to previous treatment: felt relief in his low back     Functional change: guarded movements at times when he feels that his back is "about to go out"    Pain: 0/10     Location: left low back    OBJECTIVE     Objective Measures updated at progress report unless specified.     TREATMENT     Chuy received the treatments listed below:     MANUAL THERAPY TECHNIQUES were applied for (0) minutes, including:    Manual Intervention Performed Today    Soft Tissue Mobilization  bilateral thoracic paraspinals, lumbar paraspinals  and quadratus lumborum    Joint Mobilizations     Mobilization with movement          Functional Dry Needling        Plan for Next Visit:      THERAPEUTIC EXERCISES to develop strength, endurance, ROM, flexibility, posture and core stabilization for (40) minutes including:    Intervention Performed Today    Nustep  5 minutes level 6 (added) "   Exercise bike x 5 minutes level 3    Piriformis stretch  30 seconds bialteral   Hamstring stretch 90/90  30 seconds bialteral   Bridges from Theraball (progressed to incorporate ball) x 2x12 reps    Transverse abdominis contraction  3 minutes 5 minutes   Transverse abdominis contraction X  X  x March in place 2 minutes x 2 (increased)  Upper extremity  2 minutes x 2 (added)   March in place/shoulder flexion 2 minutes x 2 (added)   Straight leg raise with abdominal brace and shoulder flexion x 3 x 8 bilateral (added)   Sitting ball roll outs  10 x 10 second hold 3 directions   clams  2 x 10 green band bilateral    Prone shoulder flexion with contralateral hip extension  2 x 10 bilateral   Bird Dogs       shoulder flexion   hip extension  Full combination 2x10 reps (initial instability with cueing provided)   Open book    8 reps x 5 second hold  x10 reps green theraband (added)   Quadratus Lumborum Stretch in Standing (added)  x30 second holds B (more of a stretch experienced on left after performing on right first)   payloff press x X 15 20 pounds bilateral (added)   Anti rotation walk outs x X 8 bilateral (added)   monster walks  Red band at ankles 5 laps forward/backward (added)   Side to side walks x 5 laps mirror edge to edge red band at ankles (added)   planks   prone   side lying          Plan for Next Visit:        PATIENT EDUCATION AND HOME EXERCISES     Home Exercises Provided and Patient Education Provided     Education provided: (during session) minutes   Patient educated on biomechanical justification for therapeutic exercise and importance of compliance with home exercise program  in order to improve overall impairments and quality of life    Patient was educated on all the above exercise prior/during/after for proper posture, positioning, and execution for safe performance with home exercise program.     Written Home Exercises Provided: yes.  Exercises were reviewed and Chuy was able to  demonstrate them prior to the end of the session.  Chuy demonstrated good  understanding of the education provided. See electronic medical record  under Patient Instructions for exercises provided during therapy sessions.      ASSESSMENT     Progressed to standing and more difficult mat exercises to advance patient's core stability. He tolerated this with no reports of fatigue. Patient reported feeling good without any pain upon exiting session today.     Chuy is progressing well towards his goals.   Pt prognosis is Good.     Pt will continue to benefit from skilled outpatient physical therapy to address the deficits listed in the problem list box on initial evaluation, provide pt/family education and to maximize pt's level of independence in the home and community environment.     Pt's spiritual, cultural and educational needs considered and pt agreeable to plan of care and goals.       Anticipated Barriers for therapy: chronicity of condition      Goals:  Short Term Goals:  6 weeks 2/28/2022   1. Pain: Pt will demonstrate improved pain by reports of less than or equal to 3/10 worst pain on the verbal rating scale in order to progress toward maximal functional ability and improve quality of life .     2. Mobility: Patient will improve active range of motion  to 50% of stated goals, listed in objective measures above, in order to progress towards independence with functional activities.      3. Strength: Patient will improve strength to 4/5 in all muscle groups, listed in objective measures above, in order to progress towards independence with functional activities.      4. Home exercise program : Patient will demonstrate independence with home exercise program  in order to progress toward functional independence.     5. Patient will report undisturbed sleep secondary to improving low back pain to note improving overall quality of life and return to prior level of function.         Long Term Goals:  12 weeks  2/28/2022   1. Pain: Pt will demonstrate improved pain by reports of less than or equal to 1/10 worst pain on the verbal rating scale in order to progress toward maximal functional ability and improve quality of life .       2. Mobility: Patient will improve AROM to stated goals, listed in objective measures above, in order to return to maximal functional potential and improve quality of life.     3. Strength: Patient will improve strength to stated goals, listed in objective measures above, in order to improve functional independence and quality of life.     4. Gait: Patient will demonstrate normalized gait mechanics with minimal compensation in order to return to prior level of function .     5. Patient will return to normal activties of daily living 's, IADL's, recreational activities, and work-related activities with less than or equal to 1/10 pain and maximal function.      6. Patient will report 1/10 pain or less for 2 consecutive weeks upon waking up noting adequate muscle tone, strength and endurance to decrease recurrence of flare ups.            Goals Key:  PC= progressing/continue; PM= partially met;        DC= discontinue    PLAN     Continue Plan of Care  and progress per patient tolerance. See treatment section for details on planned progressions next session.      Arian Benz, PTA

## 2022-03-25 ENCOUNTER — CLINICAL SUPPORT (OUTPATIENT)
Dept: REHABILITATION | Facility: HOSPITAL | Age: 67
End: 2022-03-25
Payer: MEDICARE

## 2022-03-25 DIAGNOSIS — Z74.09 DECREASED STRENGTH, ENDURANCE, AND MOBILITY: ICD-10-CM

## 2022-03-25 DIAGNOSIS — R53.1 DECREASED STRENGTH, ENDURANCE, AND MOBILITY: ICD-10-CM

## 2022-03-25 DIAGNOSIS — R29.3 POSTURAL IMBALANCE: Primary | ICD-10-CM

## 2022-03-25 DIAGNOSIS — M62.89 ABNORMAL INCREASED MUSCLE TONE: ICD-10-CM

## 2022-03-25 DIAGNOSIS — R68.89 DECREASED STRENGTH, ENDURANCE, AND MOBILITY: ICD-10-CM

## 2022-03-25 LAB — NONINV COLON CA DNA+OCC BLD SCRN STL QL: NEGATIVE

## 2022-03-25 PROCEDURE — 97110 THERAPEUTIC EXERCISES: CPT | Mod: CQ

## 2022-03-25 NOTE — PROGRESS NOTES
"  [facility]   Physical Therapy Treatment Note     Name: Chuy Bucio  Clinic Number: [id number]    Therapy Diagnosis:   Encounter Diagnoses   Name Primary?    Postural imbalance Yes    Abnormal increased muscle tone     Decreased strength, endurance, and mobility      Physician: MARISELA Marie MD    Visit Date: 3/25/2022      Physician Orders: PT Eval and Treat  Medical Diagnosis from Referral: Chronic left-sided low back pain without sciatica  Evaluation Date: 2/28/2022  Authorization Period Expiration: 5/28/2022  Plan of Care Expiration: 5/28/2022  Progress Note Due: 3/28/2022  Visit # / Visits authorized: 5/17   FOTO: 1/3     Precautions: Standard    PTA Visit #: 2/5     Time In: 0745  Time Out: 0815  Total Billable Time: 41 minutes (Billing reflects 1 on 1 treatment time spent with patient)     SUBJECTIVE     Patient reports: that he feels that therapy is not helping his pain and reports that he has benefited by coming to physicalt therapy but that he is still having the same pain as he always has. He is thinking he will self discharge in the near future.       He was compliant with home exercise program.    Response to previous treatment: felt relief in his low back     Functional change: guarded movements at times when he feels that his back is "about to go out"    Pain: 1/10     Location: left low back    OBJECTIVE     Objective Measures updated at progress report unless specified.     TREATMENT     Chuy received the treatments listed below:     MANUAL THERAPY TECHNIQUES were applied for (0) minutes, including:    Manual Intervention Performed Today    Soft Tissue Mobilization  bilateral thoracic paraspinals, lumbar paraspinals  and quadratus lumborum    Joint Mobilizations     Mobilization with movement          Functional Dry Needling        Plan for Next Visit:      THERAPEUTIC EXERCISES to develop strength, endurance, ROM, flexibility, posture and core " stabilization for (41) minutes including:    Intervention Performed Today    Nustep  5 minutes level 6 (added)   Exercise bike  5 minutes level 3    elliptical x 8 minutes level 1   Piriformis stretch  30 seconds bialteral   Hamstring stretch 90/90  30 seconds bialteral   Bridges from Theraball (progressed to incorporate ball)  2x12 reps    Transverse abdominis contraction  3 minutes 5 minutes   Transverse abdominis contraction      March in place 2 minutes x 2 (increased)  Upper extremity  2 minutes x 2 (added)   March in place/shoulder flexion 2 minutes x 2 (added)   Straight leg raise with abdominal brace and shoulder flexion  3 x 8 bilateral (added)   Sitting ball roll outs  10 x 10 second hold 3 directions   clams  2 x 10 green band bilateral    Prone shoulder flexion with contralateral hip extension  2 x 10 bilateral   Bird Dogs       shoulder flexion   hip extension  Full combination 2x10 reps (initial instability with cueing provided)   Open book    8 reps x 5 second hold  x10 reps green theraband (added)   Quadratus Lumborum Stretch in Standing (added)  x30 second holds B (more of a stretch experienced on left after performing on right first)   payloff press  X 15 20 pounds bilateral (added)   Anti rotation walk outs  X 8 bilateral (added)   monster walks  Red band at ankles 5 laps forward/backward (added)   Side to side walks  5 laps mirror edge to edge red band at ankles (added)   planks X  x X 10 prone static posture 15 second hold  X 5 side lying static posture 10 second hold   Superman's  x X 30    Active range of motion double knee to chest lower extremities on swiss ball        Bird dogs X  X  x X 30 alternating upper extremity   X 30 alternating lower extremity  X 30 combinations of upper extremity/lower extremity    1/2 kneeling core strengthening X  x X 15 each side  X 15 each side in diagonal pattern                Plan for Next Visit:        PATIENT EDUCATION AND HOME EXERCISES     Home  Exercises Provided and Patient Education Provided     Education provided: (during session) minutes   Patient educated on biomechanical justification for therapeutic exercise and importance of compliance with home exercise program  in order to improve overall impairments and quality of life    Patient was educated on all the above exercise prior/during/after for proper posture, positioning, and execution for safe performance with home exercise program.     Written Home Exercises Provided: yes.  Exercises were reviewed and Chuy was able to demonstrate them prior to the end of the session.  Chuy demonstrated good  understanding of the education provided. See electronic medical record  under Patient Instructions for exercises provided during therapy sessions.      ASSESSMENT     Progressed exercises with noted decreased core and hip stability with quadruped exercises. He performed all exercises with no rest between each set and progressed to the next exercise without rest. H verbally reports no fatigue this session but does demonstrated fatigue with side lying planks.     Chuy is progressing well towards his goals.   Pt prognosis is Good.     Pt will continue to benefit from skilled outpatient physical therapy to address the deficits listed in the problem list box on initial evaluation, provide pt/family education and to maximize pt's level of independence in the home and community environment.     Pt's spiritual, cultural and educational needs considered and pt agreeable to plan of care and goals.       Anticipated Barriers for therapy: chronicity of condition      Goals:  Short Term Goals:  6 weeks 2/28/2022   1. Pain: Pt will demonstrate improved pain by reports of less than or equal to 3/10 worst pain on the verbal rating scale in order to progress toward maximal functional ability and improve quality of life .     2. Mobility: Patient will improve active range of motion  to 50% of stated goals, listed in  objective measures above, in order to progress towards independence with functional activities.      3. Strength: Patient will improve strength to 4/5 in all muscle groups, listed in objective measures above, in order to progress towards independence with functional activities.      4. Home exercise program : Patient will demonstrate independence with home exercise program  in order to progress toward functional independence.     5. Patient will report undisturbed sleep secondary to improving low back pain to note improving overall quality of life and return to prior level of function.         Long Term Goals:  12 weeks 2/28/2022   1. Pain: Pt will demonstrate improved pain by reports of less than or equal to 1/10 worst pain on the verbal rating scale in order to progress toward maximal functional ability and improve quality of life .       2. Mobility: Patient will improve active range of motion maximal functional potential and improve quality of life.     3. Strength: Patient will improve strength to stated goals, listed in objective measures above, in order to improve functional independence and quality of life.     4. Gait: Patient will demonstrate normalized gait mechanics with minimal compensation in order to return to prior level of function .     5. Patient will return to normal activties of daily living 's, IADL's, recreational activities, and work-related activities with less than or equal to 1/10 pain and maximal function.      6. Patient will report 1/10 pain or less for 2 consecutive weeks upon waking up noting adequate muscle tone, strength and endurance to decrease recurrence of flare ups.            Goals Key:  PC= progressing/continue; PM= partially met;        DC= discontinue    PLAN     Continue Plan of Care  and progress per patient tolerance. See treatment section for details on planned progressions next session.      Arian Benz, PTA

## 2022-03-27 NOTE — PROGRESS NOTES
"TO MY TEAM:   -Please contact Chuy to relay message below or at least verify his receipt and understanding of this same message sent to him via Patient Portal.  -Schedule him for appointment (virtual visit encouraged) to discuss treatment options.    Thanks!  --------------------------------------------------------------------------------   --------------------------------------------------------------------------------    Chuy Doherty.    I'm happy to report that your recent test results are fine EXCEPT that your lipid (cholesterol) panel shows that your cholesterol levels are in the unhealthy range. High cholesterol levels can cause atherosclerosis or "hardening of the arteries." This significantly increases your risk of having a heart attack or stroke. Fortunately, some effective treatment options are available.    The first-line treatment is a heart-healthy lifestyle, including a healthy diet and exercise.    For many people, a heart-healthy lifestyle is not enough by itself. Good medications are available that can lower cholesterol levels, but I don't call them "cholesterol-lowering medicines." I call them "heart attack and stroke prevention medicines" because the reason we take them is to lower your risk of heart attack and stroke.    The decision to start a medicine to lower cholesterol is made on a case-by-case basis, vtaking into consideration your risk for developing heart disease. Based on your risk, I recommend that you start one of these medicines to lower your risk of heart attack and stroke.    I've asked my team to contact you to schedule a virtual visit appointment so we can discuss your treatment options.    I look forward to seeing you then.     All the best,    MARISELA Marie MD  "

## 2022-04-01 ENCOUNTER — CLINICAL SUPPORT (OUTPATIENT)
Dept: REHABILITATION | Facility: HOSPITAL | Age: 67
End: 2022-04-01
Payer: MEDICARE

## 2022-04-01 DIAGNOSIS — M62.89 ABNORMAL INCREASED MUSCLE TONE: ICD-10-CM

## 2022-04-01 DIAGNOSIS — R68.89 DECREASED STRENGTH, ENDURANCE, AND MOBILITY: ICD-10-CM

## 2022-04-01 DIAGNOSIS — R53.1 DECREASED STRENGTH, ENDURANCE, AND MOBILITY: ICD-10-CM

## 2022-04-01 DIAGNOSIS — Z74.09 DECREASED STRENGTH, ENDURANCE, AND MOBILITY: ICD-10-CM

## 2022-04-01 DIAGNOSIS — R29.3 POSTURAL IMBALANCE: Primary | ICD-10-CM

## 2022-04-01 PROCEDURE — 97110 THERAPEUTIC EXERCISES: CPT | Mod: CQ

## 2022-04-01 NOTE — PROGRESS NOTES
[facility]   Physical Therapy Treatment Note + Discharge Summary    Name: Chuy Bucio  Clinic Number: [id number]    Therapy Diagnosis:   Encounter Diagnoses   Name Primary?    Postural imbalance Yes    Abnormal increased muscle tone     Decreased strength, endurance, and mobility      Physician: MARISELA Marie MD    Visit Date: 4/1/2022      Physician Orders: PT Eval and Treat  Medical Diagnosis from Referral: Chronic left-sided low back pain without sciatica  Evaluation Date: 2/28/2022  Authorization Period Expiration: 5/28/2022  Plan of Care Expiration: 5/28/2022  Progress Note Due: 3/28/2022  Visit # / Visits authorized: 6/17 (+1 for evaluation)  FOTO: 2/3     Precautions: Standard    PTA Visit #: 3/5     Time In: 0745  Time Out: 0815  Total Billable Time: 39 minutes (Billing reflects 1 on 1 treatment time spent with patient)     SUBJECTIVE     Patient reports: that he feels that therapy is not reaching the origin of his pain and reports that he has benefited by coming to physical therapy but that he is still having the same pain as he always has. He would like to make his last visit today.        He was compliant with home exercise program.    Response to previous treatment: felt relief in his low back   Functional change: will join gym and begin swim program in near future    Pain: 1/10     Location: left low back    OBJECTIVE     Objective Measures updated at progress report unless specified.       Range of Motion/Strength:      Thoracolumbar AROM  2/28/2022 Pain/Dysfunction with Movement Goal Right 4/1/2022   Flexion (60) 60  Tightness in hamstrings No tightness 55   Extension (30) 25 No pain   25   Right side bending (25) 15 No pain 25  25   Left side bending (25) 12 Pain in left sided low back 25 No pain 10   Right rotation 40% limited Discomfort in low back 15% limited no pain Full    Left rotation 60% limited More discomfort in low back  15% limited no pain Full        Hip Right  2/28/2022 Left  2/28/2022 Pain/Dysfunction with Movement Goal Right 4/1/2022 Left 4/1/2022   AROM             Flexion (120)  Full  Full No pain - 134 126   Extension (30)  10  10 Pain in low back on left side 25 No pain 20 18   Abduction (45)  35  30 Tightness in adductors B 45 No tightness 53 50   IR (45)  25  35 Pain in low back 40 No pain 40 43   ER (45)  45  65 No pain  - 48 62      Knee Right  2/28/2022 Left  2/28/2022 Right 4/1/2022 Left 4/1/2022   AROM         Flexion (135)  Full  Full Full  Full   Extension (0)  0  0 0 0      L/E MMT Right Left Pain/Dysfunction with Movement Goal Right 4/1/2022 Left 4/1/2022   Hip Flexion  4-/5 3+/5 No pain 4+/5 B 4/5  4/5    Hip Extension  4-/5 3+/5 Discomfort in low back 4+/5 B 4+/5 5/5   Hip Abduction  4-/5 4-/5 Pain in right sided low back when testing on left 4+/5 B 5/5 5/5   Hip Adduction 4-/5 3+/5 No pain 4+/5 B 5/5 5/5   Hip IR 4-/5 3+/5 No pain 4+/5 B 4/5 4/5   Hip ER 4-/5 3+/5 No pain 4+/5 B 4+/5 4+/5          TREATMENT     Chuy received the treatments listed below:     MANUAL THERAPY TECHNIQUES were applied for (0) minutes, including:    Manual Intervention Performed Today    Soft Tissue Mobilization  bilateral thoracic paraspinals, lumbar paraspinals  and quadratus lumborum    Joint Mobilizations     Mobilization with movement          Functional Dry Needling        Plan for Next Visit:      THERAPEUTIC EXERCISES to develop strength, endurance, ROM, flexibility, posture and core stabilization for (39) minutes including: obtaining objective measurements    Intervention Performed Today    Nustep  5 minutes level 6 (added)   Exercise bike x 5 minutes level 3    elliptical  8 minutes level 1   Piriformis stretch  30 seconds bialteral   Hamstring stretch 90/90  30 seconds bialteral   Bridges from [employer]zi Groupll (progressed to incorporate ball)  2x12 reps    Transverse abdominis contraction  3 minutes 5 minutes   Transverse abdominis contraction      March  in place 2 minutes x 2 (increased)  Upper extremity  2 minutes x 2 (added)   March in place/shoulder flexion 2 minutes x 2 (added)   Straight leg raise with abdominal brace and shoulder flexion  3 x 8 bilateral (added)   Sitting ball roll outs  10 x 10 second hold 3 directions   clams  2 x 10 green band bilateral    Prone shoulder flexion with contralateral hip extension  2 x 10 bilateral   Bird Dogs       shoulder flexion   hip extension  Full combination 2x10 reps (initial instability with cueing provided)   Open book    8 reps x 5 second hold  x10 reps green theraband (added)   Quadratus Lumborum Stretch in Standing (added)  x30 second holds B (more of a stretch experienced on left after performing on right first)   payloff press  X 15 20 pounds bilateral (added)   Anti rotation walk outs  X 8 bilateral (added)   monster walks  Red band at ankles 5 laps forward/backward (added)   Side to side walks  5 laps mirror edge to edge red band at ankles (added)   planks    X 10 prone static posture 15 second hold  X 5 side lying static posture 10 second hold   Superman's   X 30    Active range of motion double knee to chest lower extremities on swiss ball        Bird dogs      X 30 alternating upper extremity   X 30 alternating lower extremity  X 30 combinations of upper extremity/lower extremity    1/2 kneeling core strengthening    X 15 each side  X 15 each side in diagonal pattern                Plan for Next Visit:        PATIENT EDUCATION AND HOME EXERCISES     Home Exercises Provided and Patient Education Provided     Education provided: (during session) minutes  Patient educated on biomechanical justification for therapeutic exercise and importance of compliance with home exercise program  in order to improve overall impairments and quality of life   Patient was educated on all the above exercise prior/during/after for proper posture, positioning, and execution for safe performance with home exercise program.    Explained to patient how to progress his independent workouts gradually.     Written Home Exercises Provided: yes.  Exercises were reviewed and Chuy was able to demonstrate them prior to the end of the session.  Chuy demonstrated good  understanding of the education provided. See electronic medical record  under Patient Instructions for exercises provided during therapy sessions.        ASSESSMENT     Mr. Bucio reports that all though he has made progress with attending physical therapy, he has not experienced a change in the origin of low back pain. He has noted improvement in the following ranges of motion: lumbar right side bending, bilateral rotation, bilateral hip extension, bilateral hip abduction,and bilateral hip internal rotation. He is also able to demonstrate an improvement in the following muscle groups: left hip flexion, bilateral hip extension, adduction, abduction, internal rotation, and external rotation. He had good understanding of how to progress himself with his independent gym workouts.     Chuy is progressing well towards his goals.   Pt prognosis is Good.     Pt will continue to benefit from skilled outpatient physical therapy to address the deficits listed in the problem list box on initial evaluation, provide pt/family education and to maximize pt's level of independence in the home and community environment.     Pt's spiritual, cultural and educational needs considered and pt agreeable to plan of care and goals.       Anticipated Barriers for therapy: chronicity of condition      Goals:  Short Term Goals:  6 weeks 4/1//2022   Pain: Pt will demonstrate improved pain by reports of less than or equal to 3/10 worst pain on the verbal rating scale in order to progress toward maximal functional ability and improve quality of life .  MET   Mobility: Patient will improve active range of motion  to 50% of stated goals, listed in objective measures above, in order to progress towards  independence with functional activities.   PARTIALLY MET   Strength: Patient will improve strength to 4/5 in all muscle groups, listed in objective measures above, in order to progress towards independence with functional activities.   MET   Home exercise program : Patient will demonstrate independence with home exercise program  in order to progress toward functional independence.  MET   Patient will report undisturbed sleep secondary to improving low back pain to note improving overall quality of life and return to prior level of function.   MET      Long Term Goals:  12 weeks 4/1/2022   Pain: Pt will demonstrate improved pain by reports of less than or equal to 1/10 worst pain on the verbal rating scale in order to progress toward maximal functional ability and improve quality of life .    MET   Mobility: Patient will improve active range of motion maximal functional potential and improve quality of life.  MET   Strength: Patient will improve strength to stated goals, listed in objective measures above, in order to improve functional independence and quality of life. PARTIALLY MET   Gait: Patient will demonstrate normalized gait mechanics with minimal compensation in order to return to prior level of function .  MET   Patient will return to normal activties of daily living 's, IADL's, recreational activities, and work-related activities with less than or equal to 1/10 pain and maximal function.   PARTIALLY MET   Patient will report 1/10 pain or less for 2 consecutive weeks upon waking up noting adequate muscle tone, strength and endurance to decrease recurrence of flare ups.   MET         Goals Key:  PC= progressing/continue; PM= partially met;        DC= discontinue    PLAN     Patient to discharge today to continue home exercise program and join a gym to continue strengthening on his own. Patient has reached maximum functional improvements with Physical Therapy at this time.     GIA Abreu  Dahiana, PT, DPT

## 2022-04-14 DIAGNOSIS — A60.00 HERPES SIMPLEX INFECTION OF GENITOURINARY SYSTEM: ICD-10-CM

## 2022-04-14 NOTE — TELEPHONE ENCOUNTER
Refill Routing Note   Medication(s) are not appropriate for processing by [facility] for the following reason(s):      - Required laboratory values are outdated    [facility] action(s):  Defer Medication-related problems identified: Requires labs        Medication reconciliation completed: No     Appointments  past 12m or future 3m with PCP    Date Provider   Last Visit   2/22/2022 MARISELA Marie MD   Next Visit   Visit date not found MARISELA Marie MD   ED visits in past 90 days: 0        Note composed:2:39 PM 04/14/2022

## 2022-04-14 NOTE — TELEPHONE ENCOUNTER
Care Due:                  Date            Visit Type   Department     Provider  --------------------------------------------------------------------------------                                EP -                              PRIMARY      [facility] INTER[facility]  Last Visit: 02-      CARE ([facility])   ESAU Marie                              EP -                              PRIMARY      [facility] INTER[facility]  Next Visit: 02-      CARE ([facility])   ESAU Marie                                                            Last  Test          Frequency    Reason                     Performed    Due Date  --------------------------------------------------------------------------------    CBC.........  12 months..  acyclovir................  Not Found    Overdue    Cr..........  12 months..  acyclovir................  11- 11-    Powered by [employer]. Reference number: [id number].   4/14/2022 9:02:38 AM CDT

## 2022-04-18 ENCOUNTER — PATIENT MESSAGE (OUTPATIENT)
Dept: INTERNAL MEDICINE | Facility: CLINIC | Age: 67
End: 2022-04-18
Payer: MEDICARE

## 2022-04-20 RX ORDER — ACYCLOVIR 400 MG/1
TABLET ORAL
Qty: 90 TABLET | Refills: 3 | Status: SHIPPED | OUTPATIENT
Start: 2022-04-20 | End: 2023-06-13 | Stop reason: SDUPTHER

## 2022-04-21 NOTE — TELEPHONE ENCOUNTER
REFILL REQUEST APPROVED.  Requested Prescriptions   Pending Prescriptions Disp Refills    acyclovir (ZOVIRAX) 400 MG tablet [Pharmacy Med Name: ACYCLOVIR 400MG TABLETS] 90 tablet 3     Sig: TAKE 1 TABLET(400 MG) BY MOUTH EVERY DAY

## 2022-05-03 ENCOUNTER — PATIENT OUTREACH (OUTPATIENT)
Dept: ADMINISTRATIVE | Facility: OTHER | Age: 67
End: 2022-05-03
Payer: MEDICARE

## 2022-05-04 ENCOUNTER — OFFICE VISIT (OUTPATIENT)
Dept: DERMATOLOGY | Facility: CLINIC | Age: 67
End: 2022-05-04
Payer: MEDICARE

## 2022-05-04 DIAGNOSIS — L57.0 ACTINIC KERATOSES: ICD-10-CM

## 2022-05-04 DIAGNOSIS — R22.9 SUBCUTANEOUS NODULE: ICD-10-CM

## 2022-05-04 DIAGNOSIS — R23.8 SCALP IRRITATION: Primary | ICD-10-CM

## 2022-05-04 PROCEDURE — 1126F PR PAIN SEVERITY QUANTIFIED, NO PAIN PRESENT: ICD-10-PCS | Mod: CPTII,S$GLB,, | Performed by: STUDENT IN AN ORGANIZED HEALTH CARE EDUCATION/TRAINING PROGRAM

## 2022-05-04 PROCEDURE — 1159F MED LIST DOCD IN RCRD: CPT | Mod: CPTII,S$GLB,, | Performed by: STUDENT IN AN ORGANIZED HEALTH CARE EDUCATION/TRAINING PROGRAM

## 2022-05-04 PROCEDURE — 1101F PR PT FALLS ASSESS DOC 0-1 FALLS W/OUT INJ PAST YR: ICD-10-PCS | Mod: CPTII,S$GLB,, | Performed by: STUDENT IN AN ORGANIZED HEALTH CARE EDUCATION/TRAINING PROGRAM

## 2022-05-04 PROCEDURE — 1101F PT FALLS ASSESS-DOCD LE1/YR: CPT | Mod: CPTII,S$GLB,, | Performed by: STUDENT IN AN ORGANIZED HEALTH CARE EDUCATION/TRAINING PROGRAM

## 2022-05-04 PROCEDURE — 99214 OFFICE O/P EST MOD 30 MIN: CPT | Mod: 25,S$GLB,, | Performed by: STUDENT IN AN ORGANIZED HEALTH CARE EDUCATION/TRAINING PROGRAM

## 2022-05-04 PROCEDURE — 3288F FALL RISK ASSESSMENT DOCD: CPT | Mod: CPTII,S$GLB,, | Performed by: STUDENT IN AN ORGANIZED HEALTH CARE EDUCATION/TRAINING PROGRAM

## 2022-05-04 PROCEDURE — 1160F RVW MEDS BY RX/DR IN RCRD: CPT | Mod: CPTII,S$GLB,, | Performed by: STUDENT IN AN ORGANIZED HEALTH CARE EDUCATION/TRAINING PROGRAM

## 2022-05-04 PROCEDURE — 1160F PR REVIEW ALL MEDS BY PRESCRIBER/CLIN PHARMACIST DOCUMENTED: ICD-10-PCS | Mod: CPTII,S$GLB,, | Performed by: STUDENT IN AN ORGANIZED HEALTH CARE EDUCATION/TRAINING PROGRAM

## 2022-05-04 PROCEDURE — 99999 PR PBB SHADOW E&M-EST. PATIENT-LVL III: CPT | Mod: PBBFAC,,, | Performed by: STUDENT IN AN ORGANIZED HEALTH CARE EDUCATION/TRAINING PROGRAM

## 2022-05-04 PROCEDURE — 1126F AMNT PAIN NOTED NONE PRSNT: CPT | Mod: CPTII,S$GLB,, | Performed by: STUDENT IN AN ORGANIZED HEALTH CARE EDUCATION/TRAINING PROGRAM

## 2022-05-04 PROCEDURE — 99214 PR OFFICE/OUTPT VISIT, EST, LEVL IV, 30-39 MIN: ICD-10-PCS | Mod: 25,S$GLB,, | Performed by: STUDENT IN AN ORGANIZED HEALTH CARE EDUCATION/TRAINING PROGRAM

## 2022-05-04 PROCEDURE — 99999 PR PBB SHADOW E&M-EST. PATIENT-LVL III: ICD-10-PCS | Mod: PBBFAC,,, | Performed by: STUDENT IN AN ORGANIZED HEALTH CARE EDUCATION/TRAINING PROGRAM

## 2022-05-04 PROCEDURE — 1159F PR MEDICATION LIST DOCUMENTED IN MEDICAL RECORD: ICD-10-PCS | Mod: CPTII,S$GLB,, | Performed by: STUDENT IN AN ORGANIZED HEALTH CARE EDUCATION/TRAINING PROGRAM

## 2022-05-04 PROCEDURE — 3288F PR FALLS RISK ASSESSMENT DOCUMENTED: ICD-10-PCS | Mod: CPTII,S$GLB,, | Performed by: STUDENT IN AN ORGANIZED HEALTH CARE EDUCATION/TRAINING PROGRAM

## 2022-05-04 RX ORDER — FLUOCINONIDE TOPICAL SOLUTION USP, 0.05% 0.5 MG/ML
SOLUTION TOPICAL DAILY
Qty: 60 ML | Refills: 3 | Status: SHIPPED | OUTPATIENT
Start: 2022-05-04 | End: 2022-09-15

## 2022-05-04 RX ORDER — CALCIPOTRIENE 0.05 MG/ML
SOLUTION TOPICAL
Qty: 60 ML | Refills: 3 | Status: SHIPPED | OUTPATIENT
Start: 2022-05-04 | End: 2022-09-15

## 2022-05-04 NOTE — PATIENT INSTRUCTIONS

## 2022-05-04 NOTE — PROGRESS NOTES
Subjective:       Patient ID:  Chuy Bucio is a 66 y.o. male who presents for   Chief Complaint   Patient presents with    Follow-up    Cyst     Middle of back      History of Present Illness: The patient presents for follow up of scalp irritation, last seen on 3/9/22 where he was started on ketoconazole shampoo and topical lidex solution. Report while using lidex, symptoms are better, but once he stops, symptoms return. Still with crusted irritated bumps on the scalp. He also has a crusted, scaly red lesion on the right eyebrow.    Patient with new complaint of lesion(s) - cyst  Location: back  Duration: growing over months  Symptoms: large growth  Relieving factors/Previous treatments: none         Review of Systems   Constitutional: Negative for fever and chills.   Skin: Negative for itching, rash and dry skin.        Objective:    Physical Exam   Constitutional: He appears well-developed and well-nourished. No distress.   Neurological: He is alert and oriented to person, place, and time. He is not disoriented.   Psychiatric: He has a normal mood and affect.   Skin:   Areas Examined (abnormalities noted in diagram):   Scalp / Hair Palpated and Inspected  Head / Face Inspection Performed  Neck Inspection Performed  Back Inspection Performed                   Diagram Legend     Erythematous scaling macule/papule c/w actinic keratosis       Vascular papule c/w angioma      Pigmented verrucoid papule/plaque c/w seborrheic keratosis      Yellow umbilicated papule c/w sebaceous hyperplasia      Irregularly shaped tan macule c/w lentigo     1-2 mm smooth white papules consistent with Milia      Movable subcutaneous cyst with punctum c/w epidermal inclusion cyst      Subcutaneous movable cyst c/w pilar cyst      Firm pink to brown papule c/w dermatofibroma      Pedunculated fleshy papule(s) c/w skin tag(s)      Evenly pigmented macule c/w junctional nevus     Mildly variegated pigmented, slightly  irregular-bordered macule c/w mildly atypical nevus      Flesh colored to evenly pigmented papule c/w intradermal nevus       Pink pearly papule/plaque c/w basal cell carcinoma      Erythematous hyperkeratotic cursted plaque c/w SCC      Surgical scar with no sign of skin cancer recurrence      Open and closed comedones      Inflammatory papules and pustules      Verrucoid papule consistent consistent with wart     Erythematous eczematous patches and plaques     Dystrophic onycholytic nail with subungual debris c/w onychomycosis     Umbilicated papule    Erythematous-base heme-crusted tan verrucoid plaque consistent with inflamed seborrheic keratosis     Erythematous Silvery Scaling Plaque c/w Psoriasis     See annotation      Assessment / Plan:        Scalp irritation - will add topical calcipotriene  -     fluocinonide (LIDEX) 0.05 % external solution; Apply topically once daily.  Dispense: 60 mL; Refill: 3  -     calcipotriene 0.005 % sclp soln; Apply topically to the scalp daily  Dispense: 60 mL; Refill: 3    Actinic keratosis  Cryosurgery Procedure Note    Verbal consent from the patient is obtained including, but not limited to, risk of hypopigmentation/hyperpigmentation, scar, recurrence of lesion. The patient is aware of the precancerous quality and need for treatment of these lesions. Liquid nitrogen cryosurgery is applied to the 1 actinic keratoses, as detailed in the physical exam, to produce a freeze injury. The patient is aware that blisters may form and is instructed on wound care with gentle cleansing and use of vaseline ointment to keep moist until healed. The patient is supplied a handout on cryosurgery and is instructed to call if lesions do not completely resolve.      Subcutaneous nodule  Reassurance given to patient. No treatment is necessary.   Discussed treatment options - excision vs observation. Cysts may recur with excision. Pt will defer treatment at this time.              Follow up in  about 8 weeks (around 6/29/2022).

## 2022-09-06 ENCOUNTER — OFFICE VISIT (OUTPATIENT)
Dept: URGENT CARE | Facility: CLINIC | Age: 67
End: 2022-09-06
Payer: MEDICARE

## 2022-09-06 VITALS
OXYGEN SATURATION: 97 % | BODY MASS INDEX: 22.49 KG/M2 | HEART RATE: 82 BPM | DIASTOLIC BLOOD PRESSURE: 80 MMHG | RESPIRATION RATE: 20 BRPM | TEMPERATURE: 100 F | WEIGHT: 135 LBS | SYSTOLIC BLOOD PRESSURE: 110 MMHG | HEIGHT: 65 IN

## 2022-09-06 DIAGNOSIS — R68.83 CHILLS: ICD-10-CM

## 2022-09-06 DIAGNOSIS — U07.1 COVID-19 VIRUS DETECTED: ICD-10-CM

## 2022-09-06 DIAGNOSIS — U07.1 COVID-19 VIRUS DETECTED: Primary | ICD-10-CM

## 2022-09-06 LAB
CTP QC/QA: YES
SARS-COV-2 RDRP RESP QL NAA+PROBE: POSITIVE

## 2022-09-06 PROCEDURE — 3008F PR BODY MASS INDEX (BMI) DOCUMENTED: ICD-10-PCS | Mod: CPTII,S$GLB,, | Performed by: NURSE PRACTITIONER

## 2022-09-06 PROCEDURE — 1125F PR PAIN SEVERITY QUANTIFIED, PAIN PRESENT: ICD-10-PCS | Mod: CPTII,S$GLB,, | Performed by: NURSE PRACTITIONER

## 2022-09-06 PROCEDURE — 3079F DIAST BP 80-89 MM HG: CPT | Mod: CPTII,S$GLB,, | Performed by: NURSE PRACTITIONER

## 2022-09-06 PROCEDURE — 99214 OFFICE O/P EST MOD 30 MIN: CPT | Mod: S$GLB,,, | Performed by: NURSE PRACTITIONER

## 2022-09-06 PROCEDURE — U0002 COVID-19 LAB TEST NON-CDC: HCPCS | Mod: QW,S$GLB,, | Performed by: NURSE PRACTITIONER

## 2022-09-06 PROCEDURE — 3008F BODY MASS INDEX DOCD: CPT | Mod: CPTII,S$GLB,, | Performed by: NURSE PRACTITIONER

## 2022-09-06 PROCEDURE — 3074F PR MOST RECENT SYSTOLIC BLOOD PRESSURE < 130 MM HG: ICD-10-PCS | Mod: CPTII,S$GLB,, | Performed by: NURSE PRACTITIONER

## 2022-09-06 PROCEDURE — 1159F PR MEDICATION LIST DOCUMENTED IN MEDICAL RECORD: ICD-10-PCS | Mod: CPTII,S$GLB,, | Performed by: NURSE PRACTITIONER

## 2022-09-06 PROCEDURE — 1159F MED LIST DOCD IN RCRD: CPT | Mod: CPTII,S$GLB,, | Performed by: NURSE PRACTITIONER

## 2022-09-06 PROCEDURE — 3079F PR MOST RECENT DIASTOLIC BLOOD PRESSURE 80-89 MM HG: ICD-10-PCS | Mod: CPTII,S$GLB,, | Performed by: NURSE PRACTITIONER

## 2022-09-06 PROCEDURE — 3074F SYST BP LT 130 MM HG: CPT | Mod: CPTII,S$GLB,, | Performed by: NURSE PRACTITIONER

## 2022-09-06 PROCEDURE — 99214 PR OFFICE/OUTPT VISIT, EST, LEVL IV, 30-39 MIN: ICD-10-PCS | Mod: S$GLB,,, | Performed by: NURSE PRACTITIONER

## 2022-09-06 PROCEDURE — U0002: ICD-10-PCS | Mod: QW,S$GLB,, | Performed by: NURSE PRACTITIONER

## 2022-09-06 PROCEDURE — 1125F AMNT PAIN NOTED PAIN PRSNT: CPT | Mod: CPTII,S$GLB,, | Performed by: NURSE PRACTITIONER

## 2022-09-06 NOTE — LETTER
[id number] [facility] [address] ? [place], [address] ? Phone [phone] ? Fax             Return to Work/School    Patient: Chuy Bucio  YOB: 1955   Date: 09/06/2022      To Whom It May Concern:     Chuy Bucio was in contact with/seen in my office on 09/06/2022. COVID-19 is present in our communities across the state. Not all patients are eligible or appropriate to be tested. In this situation, your employee meets the following criteria:     Chuy Bucio has met the criteria for COVID-19 testing and has a POSITIVE result. He can return to work once they are asymptomatic for 24 hours without the use of fever reducing medications AND at least five days from the start of symptoms (or from the first positive result if they have no symptoms).      If you have any questions or concerns, or if I can be of further assistance, please do not hesitate to contact me.     Sincerely,    Earnestine Ruiz NP

## 2022-09-06 NOTE — PATIENT INSTRUCTIONS
**Calculating Isolation:   Day 0 is your first day of symptoms or a positive viral test. Day 1 is the first full day after your symptoms developed or your test specimen was collected. If you have COVID-19 or have symptoms, isolate for at least 5 days.    Instructions for Patients with Confirmed or Suspected COVID-19    Stay home for 5 days and isolate from others in your home.    Wear a well-fitting mask if you must be around others in your home.    Do not travel.    End isolation after 5 full days if you are fever-free for 24 hours (without the use of fever-reducing medication) and your symptoms are improving.    End isolation after at least 5 full days after your positive test.    If you got very sick from COVID-19 or have a weakened immune system  You should isolate for at least 10 days. Consult your doctor before ending isolation.    Take precautions until day 10    Wear a well-fitting mask for 10 full days any time you are around others inside your home or in public. Do not go to places where you are unable to wear a mask.    Do not travel until a full 10 days after your symptoms started or the date your positive test was taken if you had no symptoms.      Avoid being around people who are more likely to get very sick from COVID-19.    Separate yourself from other people and animals in your home.  Call ahead before visiting your doctor.  Wear a facemask.  Cover your coughs and sneezes.  Wash your hands often with soap and water; hand  can be used, too.  Avoid sharing personal household items.  Wipe down surfaces used daily.  Monitor your symptoms. Seek prompt medical attention if your illness is worsening (e.g., difficulty breathing).   Before seeking care, call your healthcare provider.  If you have a medical emergency and need to call 911, notify the dispatch personnel that you have, or are being evaluated for COVID-19. If possible, put on a facemask before emergency medical services  arrive.        Recommended precautions for household members, intimate partners, and caregivers in a home setting of a patient with symptomatic laboratory-confirmed COVID-19 or a patient under investigation.  Household members, intimate partners, and caregivers in the home setting awaiting tests results have close contact with a person with symptomatic, laboratory-confirmed COVID-19 or a person under investigation. Close contacts should monitor their health; they should call their provider right away if they develop symptoms suggestive of COVID-19 (e.g., fever, cough, shortness of breath).    Close contacts should also follow these recommendations:  Make sure that you understand and can help the patient follow their provider's instructions for medication(s) and care. You should help the patient with basic needs in the home and provide support for getting groceries, prescriptions, and other personal needs.  Monitor the patient's symptoms. If the patient is getting sicker, call his or her healthcare provider and tell them that the patient has laboratory-confirmed COVID-19. If the patient has a medical emergency and you need to call 911, notify the dispatch personnel that the patient has, or is being evaluated for COVID-19.  Household members should stay in another room or be  from the patient. Household members should use a separate bedroom and bathroom, if available.  Prohibit visitors.  Household members should care for any pets in the home.  Make sure that shared spaces in the home have good air flow, such as by an air conditioner or an opened window, weather permitting.  Perform hand hygiene frequently. Wash your hands often with soap and water for at least 20 seconds or use an alcohol-based hand  (that contains > 60% alcohol) covering all surfaces of your hands and rubbing them together until they feel dry. Soap and water should be used preferentially.  Avoid touching your eyes, nose, and  mouth.  The patient should wear a facemask. If the patient is not able to wear a facemask (for example, because it causes trouble breathing), caregivers should wear a mask when they are in the same room as the patient.  Wear a disposable facemask and gloves when you touch or have contact with the patient's blood, stool, or body fluids, such as saliva, sputum, nasal mucus, vomit, urine.  Throw out disposable facemasks and gloves after using them. Do not reuse.  When removing personal protective equipment, first remove and dispose of gloves. Then, immediately clean your hands with soap and water or alcohol-based hand . Next, remove and dispose of facemask, and immediately clean your hands again with soap and water or alcohol-based hand .  You should not share dishes, drinking glasses, cups, eating utensils, towels, bedding, or other items with the patient. After the patient uses these items, you should wash them thoroughly (see below Wash laundry thoroughly).  Clean all high-touch surfaces, such as counters, tabletops, doorknobs, bathroom fixtures, toilets, phones, keyboards, tablets, and bedside tables, every day. Also, clean any surfaces that may have blood, stool, or body fluids on them.  Use a household cleaning spray or wipe, according to the label instructions. Labels contain instructions for safe and effective use of the cleaning product including precautions you should take when applying the product, such as wearing gloves and making sure you have good ventilation during use of the product.  Wash laundry thoroughly.  Immediately remove and wash clothes or bedding that have blood, stool, or body fluids on them.  Wear disposable gloves while handling soiled items and keep soiled items away from your body. Clean your hands (with soap and water or an alcohol-based hand ) immediately after removing your gloves.  Read and follow directions on labels of laundry or clothing items and  detergent. In general, using a normal laundry detergent according to washing machine instructions and dry thoroughly using the warmest temperatures recommended on the clothing label.  Place all used disposable gloves, facemasks, and other contaminated items in a lined container before disposing of them with other household waste. Clean your hands (with soap and water or an alcohol-based hand ) immediately after handling these items. Soap and water should be used preferentially if hands are visibly dirty.  Discuss any additional questions with your state or local health department or healthcare provider. Check available hours when contacting your local health department.    For more information see CDC link below.      https://www.cdc.gov/coronavirus/2019-ncov/hcp/guidance-prevent-spread.html#precautions        Sources:  [facility]      Below are suggestions for symptomatic relief:   -Tylenol every 4 hours OR ibuprofen every 6 hours as needed for pain/fever.   -Tylenol Cold & Flu, Mucinex for cough and congestion   -Salt water gargles to soothe throat pain.   -Chloroseptic spray also helps to numb throat pain.   -Nasal saline spray reduces inflammation and dryness.   -Warm face compresses to help with facial sinus pain/pressure.   -Vicks vapor rub at night.   -Flonase OTC or Nasacort OTC for nasal congestion.   -Mcdowell's Honey Lemon Drops (for sore throat) or Mucinex INSTA SOOTHE cough drops (for cough and sore throat)    -Simple foods like chicken noodle soup.   -Delsym helps with coughing at night   -Zyrtec or Claritin during the day & Benadryl at night may help with allergies.     If you DO NOT have Hypertension or any history of palpitations, it is ok to take over the counter Sudafed or Mucinex D or Allegra-D or Claritin-D or Zyrtec-D.  If you do take one of the above, it is ok to combine that with plain over the counter Mucinex or Allegra or Claritin or Zyrtec. If,  for example, you are taking Zyrtec -D, you can combine that with Mucinex, but not Mucinex-D.  If you are taking Mucinex-D, you can combine that with plain Allegra or Claritin or Zyrtec.   If you DO have Hypertension or palpitations, it is safe to take Coricidin HBP for relief of sinus symptoms.    If you were prescribed a narcotic or controlled medication, do not drive or operate heavy equipment or machinery while taking these medications.  You must understand that you've received an Urgent Care treatment only and that you may be released before all your medical problems are known or treated. You, the patient, will arrange for follow up care as instructed.  Follow up with your PCP or specialty clinic as directed within 2-5 days if not improved or as needed.  You can call [phone] to schedule an appointment with the appropriate provider.  If your condition worsens we recommend that you receive another evaluation at the emergency room immediately or contact your primary medical clinics after hours call service to discuss your concerns.  Please return here or go to the Emergency Department for any concerns or worsening of condition.

## 2022-09-06 NOTE — PROGRESS NOTES
"Subjective:       Patient ID: Chuy Bucio is a 66 y.o. male.    Vitals:  height is 5' 5" (1.651 m) and weight is 61.2 kg (135 lb). His temperature is 100.4 °F (38 °C) (abnormal). His blood pressure is 110/80 and his pulse is 82. His respiration is 20 and oxygen saturation is 97%.     Chief Complaint: COVID-19 Concerns (Did not sleep, pulse was around 120 entire night, sinus pressure like a vice all around head, needed to urinate every couple of hours.  Chills but no fever.  Wife tested positive for COVID yesterday. - Entered by patient)    66 yr old male presents to the Urgent Care with complaint of headache, nasal congestion, cough and sinus pressure x 1 day. Patient denies any CP, SOB, abdominal pain at this time. Patient's wife recently tested positive for covid.     URI   This is a new problem. The current episode started yesterday. The problem has been unchanged. The maximum temperature recorded prior to his arrival was 100.4 - 100.9 F. Associated symptoms include congestion, headaches and sinus pain. Pertinent negatives include no abdominal pain, chest pain, coughing, diarrhea, dysuria, ear pain, joint pain, joint swelling, nausea, neck pain, plugged ear sensation, rash, rhinorrhea, sneezing, sore throat, swollen glands, vomiting or wheezing. He has tried nothing for the symptoms. The treatment provided mild relief.     Constitution: Negative for chills, sweating, fatigue and fever.   HENT:  Positive for congestion and sinus pain. Negative for ear pain, postnasal drip, sinus pressure, sore throat, trouble swallowing and voice change.    Neck: Negative for neck pain.   Cardiovascular:  Negative for chest pain and sob on exertion.   Respiratory:  Negative for cough and wheezing.    Gastrointestinal:  Negative for abdominal pain, nausea, vomiting and diarrhea.   Genitourinary:  Negative for dysuria.   Skin:  Negative for rash.   Allergic/Immunologic: Negative for sneezing.   Neurological:  Positive for " headaches. Negative for altered mental status.   Psychiatric/Behavioral:  Negative for altered mental status.      Objective:      Physical Exam   Constitutional: He is oriented to person, place, and time. He appears well-developed. He is cooperative.  Non-toxic appearance. He does not appear ill.   HENT:   Head: Atraumatic.   Ears:   Right Ear: Hearing, tympanic membrane, external ear and ear canal normal.   Left Ear: Hearing, tympanic membrane, external ear and ear canal normal.   Nose: Nose normal.   Mouth/Throat: Uvula is midline, oropharynx is clear and moist and mucous membranes are normal.   Cardiovascular: Normal rate.   Pulmonary/Chest: Effort normal and breath sounds normal. No accessory muscle usage. No tachypnea. No respiratory distress.   Abdominal: Normal appearance.   Neurological: He is alert and oriented to person, place, and time. Gait normal.   Skin: Skin is not diaphoretic.   Psychiatric: He experiences Normal attention. His speech is normal and behavior is normal. Mood normal. Cognition normal  Nursing note and vitals reviewed.      Assessment:       1. COVID-19 virus detected    2. Chills       Results for orders placed or performed in visit on 09/06/22   POCT COVID-19 Rapid Screening   Result Value Ref Range    POC Rapid COVID Positive (A) Negative     Acceptable Yes        Plan:       2  Patient tested positive for COVID-19 and due to low risk factors for progression to severe disease, Paxlovid (nirmatrelvir-ritonavir) was not rx. Discussed in length about OTC symptomatic treatment. Strict ER precautions discussed. Patient verbalized understanding and agreed with tx plan. Patient noted to be stable upon discharge.    COVID-19 virus detected    Chills  -     POCT COVID-19 Rapid Screening       Patient Instructions   **Calculating Isolation:   Day 0 is your first day of symptoms or a positive viral test. Day 1 is the first full day after your symptoms developed or your test  specimen was collected. If you have COVID-19 or have symptoms, isolate for at least 5 days.    Instructions for Patients with Confirmed or Suspected COVID-19    Stay home for 5 days and isolate from others in your home.    Wear a well-fitting mask if you must be around others in your home.    Do not travel.    End isolation after 5 full days if you are fever-free for 24 hours (without the use of fever-reducing medication) and your symptoms are improving.    End isolation after at least 5 full days after your positive test.    If you got very sick from COVID-19 or have a weakened immune system  You should isolate for at least 10 days. Consult your doctor before ending isolation.    Take precautions until day 10    Wear a well-fitting mask for 10 full days any time you are around others inside your home or in public. Do not go to places where you are unable to wear a mask.    Do not travel until a full 10 days after your symptoms started or the date your positive test was taken if you had no symptoms.      Avoid being around people who are more likely to get very sick from COVID-19.    Separate yourself from other people and animals in your home.  Call ahead before visiting your doctor.  Wear a facemask.  Cover your coughs and sneezes.  Wash your hands often with soap and water; hand  can be used, too.  Avoid sharing personal household items.  Wipe down surfaces used daily.  Monitor your symptoms. Seek prompt medical attention if your illness is worsening (e.g., difficulty breathing).   Before seeking care, call your healthcare provider.  If you have a medical emergency and need to call 911, notify the dispatch personnel that you have, or are being evaluated for COVID-19. If possible, put on a facemask before emergency medical services arrive.        Recommended precautions for household members, intimate partners, and caregivers in a home setting of a patient with symptomatic laboratory-confirmed COVID-19 or  a patient under investigation.  Household members, intimate partners, and caregivers in the home setting awaiting tests results have close contact with a person with symptomatic, laboratory-confirmed COVID-19 or a person under investigation. Close contacts should monitor their health; they should call their provider right away if they develop symptoms suggestive of COVID-19 (e.g., fever, cough, shortness of breath).    Close contacts should also follow these recommendations:  Make sure that you understand and can help the patient follow their provider's instructions for medication(s) and care. You should help the patient with basic needs in the home and provide support for getting groceries, prescriptions, and other personal needs.  Monitor the patient's symptoms. If the patient is getting sicker, call his or her healthcare provider and tell them that the patient has laboratory-confirmed COVID-19. If the patient has a medical emergency and you need to call 911, notify the dispatch personnel that the patient has, or is being evaluated for COVID-19.  Household members should stay in another room or be  from the patient. Household members should use a separate bedroom and bathroom, if available.  Prohibit visitors.  Household members should care for any pets in the home.  Make sure that shared spaces in the home have good air flow, such as by an air conditioner or an opened window, weather permitting.  Perform hand hygiene frequently. Wash your hands often with soap and water for at least 20 seconds or use an alcohol-based hand  (that contains > 60% alcohol) covering all surfaces of your hands and rubbing them together until they feel dry. Soap and water should be used preferentially.  Avoid touching your eyes, nose, and mouth.  The patient should wear a facemask. If the patient is not able to wear a facemask (for example, because it causes trouble breathing), caregivers should wear a mask when they  are in the same room as the patient.  Wear a disposable facemask and gloves when you touch or have contact with the patient's blood, stool, or body fluids, such as saliva, sputum, nasal mucus, vomit, urine.  Throw out disposable facemasks and gloves after using them. Do not reuse.  When removing personal protective equipment, first remove and dispose of gloves. Then, immediately clean your hands with soap and water or alcohol-based hand . Next, remove and dispose of facemask, and immediately clean your hands again with soap and water or alcohol-based hand .  You should not share dishes, drinking glasses, cups, eating utensils, towels, bedding, or other items with the patient. After the patient uses these items, you should wash them thoroughly (see below Wash laundry thoroughly).  Clean all high-touch surfaces, such as counters, tabletops, doorknobs, bathroom fixtures, toilets, phones, keyboards, tablets, and bedside tables, every day. Also, clean any surfaces that may have blood, stool, or body fluids on them.  Use a household cleaning spray or wipe, according to the label instructions. Labels contain instructions for safe and effective use of the cleaning product including precautions you should take when applying the product, such as wearing gloves and making sure you have good ventilation during use of the product.  Wash laundry thoroughly.  Immediately remove and wash clothes or bedding that have blood, stool, or body fluids on them.  Wear disposable gloves while handling soiled items and keep soiled items away from your body. Clean your hands (with soap and water or an alcohol-based hand ) immediately after removing your gloves.  Read and follow directions on labels of laundry or clothing items and detergent. In general, using a normal laundry detergent according to washing machine instructions and dry thoroughly using the warmest temperatures recommended on the clothing  label.  Place all used disposable gloves, facemasks, and other contaminated items in a lined container before disposing of them with other household waste. Clean your hands (with soap and water or an alcohol-based hand ) immediately after handling these items. Soap and water should be used preferentially if hands are visibly dirty.  Discuss any additional questions with your state or local health department or healthcare provider. Check available hours when contacting your local health department.    For more information see CDC link below.      https://www.cdc.gov/coronavirus/2019-ncov/hcp/guidance-prevent-spread.html#precautions        Sources:  [facility], [place] [facility]      Below are suggestions for symptomatic relief:   -Tylenol every 4 hours OR ibuprofen every 6 hours as needed for pain/fever.   -Tylenol Cold & Flu, Mucinex for cough and congestion   -Salt water gargles to soothe throat pain.   -Chloroseptic spray also helps to numb throat pain.   -Nasal saline spray reduces inflammation and dryness.   -Warm face compresses to help with facial sinus pain/pressure.   -Vicks vapor rub at night.   -Flonase OTC or Nasacort OTC for nasal congestion.   -Mcdowell's Honey Lemon Drops (for sore throat) or Mucinex INSTA SOOTHE cough drops (for cough and sore throat)    -Simple foods like chicken noodle soup.   -Delsym helps with coughing at night   -Zyrtec or Claritin during the day & Benadryl at night may help with allergies.     If you DO NOT have Hypertension or any history of palpitations, it is ok to take over the counter Sudafed or Mucinex D or Allegra-D or Claritin-D or Zyrtec-D.  If you do take one of the above, it is ok to combine that with plain over the counter Mucinex or Allegra or Claritin or Zyrtec. If, for example, you are taking Zyrtec -D, you can combine that with Mucinex, but not Mucinex-D.  If you are taking Mucinex-D, you can combine that with plain Allegra or Claritin or  Zyrtec.   If you DO have Hypertension or palpitations, it is safe to take Coricidin HBP for relief of sinus symptoms.    If you were prescribed a narcotic or controlled medication, do not drive or operate heavy equipment or machinery while taking these medications.  You must understand that you've received an Urgent Care treatment only and that you may be released before all your medical problems are known or treated. You, the patient, will arrange for follow up care as instructed.  Follow up with your PCP or specialty clinic as directed within 2-5 days if not improved or as needed.  You can call [phone] to schedule an appointment with the appropriate provider.  If your condition worsens we recommend that you receive another evaluation at the emergency room immediately or contact your primary medical clinics after hours call service to discuss your concerns.  Please return here or go to the Emergency Department for any concerns or worsening of condition.

## 2022-09-15 ENCOUNTER — OFFICE VISIT (OUTPATIENT)
Dept: INTERNAL MEDICINE | Facility: CLINIC | Age: 67
End: 2022-09-15
Payer: MEDICARE

## 2022-09-15 VITALS
WEIGHT: 132.69 LBS | OXYGEN SATURATION: 95 % | SYSTOLIC BLOOD PRESSURE: 100 MMHG | HEIGHT: 65 IN | HEART RATE: 110 BPM | BODY MASS INDEX: 22.11 KG/M2 | DIASTOLIC BLOOD PRESSURE: 78 MMHG | TEMPERATURE: 98 F

## 2022-09-15 DIAGNOSIS — R05.9 COUGH: ICD-10-CM

## 2022-09-15 DIAGNOSIS — J01.10 ACUTE NON-RECURRENT FRONTAL SINUSITIS: Primary | ICD-10-CM

## 2022-09-15 PROCEDURE — 3008F PR BODY MASS INDEX (BMI) DOCUMENTED: ICD-10-PCS | Mod: CPTII,S$GLB,, | Performed by: INTERNAL MEDICINE

## 2022-09-15 PROCEDURE — 3074F SYST BP LT 130 MM HG: CPT | Mod: CPTII,S$GLB,, | Performed by: INTERNAL MEDICINE

## 2022-09-15 PROCEDURE — 3074F PR MOST RECENT SYSTOLIC BLOOD PRESSURE < 130 MM HG: ICD-10-PCS | Mod: CPTII,S$GLB,, | Performed by: INTERNAL MEDICINE

## 2022-09-15 PROCEDURE — 3078F DIAST BP <80 MM HG: CPT | Mod: CPTII,S$GLB,, | Performed by: INTERNAL MEDICINE

## 2022-09-15 PROCEDURE — 99214 OFFICE O/P EST MOD 30 MIN: CPT | Mod: S$GLB,,, | Performed by: INTERNAL MEDICINE

## 2022-09-15 PROCEDURE — 1126F AMNT PAIN NOTED NONE PRSNT: CPT | Mod: CPTII,S$GLB,, | Performed by: INTERNAL MEDICINE

## 2022-09-15 PROCEDURE — 99999 PR PBB SHADOW E&M-EST. PATIENT-LVL III: CPT | Mod: PBBFAC,,, | Performed by: INTERNAL MEDICINE

## 2022-09-15 PROCEDURE — 3008F BODY MASS INDEX DOCD: CPT | Mod: CPTII,S$GLB,, | Performed by: INTERNAL MEDICINE

## 2022-09-15 PROCEDURE — 1159F MED LIST DOCD IN RCRD: CPT | Mod: CPTII,S$GLB,, | Performed by: INTERNAL MEDICINE

## 2022-09-15 PROCEDURE — 99999 PR PBB SHADOW E&M-EST. PATIENT-LVL III: ICD-10-PCS | Mod: PBBFAC,,, | Performed by: INTERNAL MEDICINE

## 2022-09-15 PROCEDURE — 99214 PR OFFICE/OUTPT VISIT, EST, LEVL IV, 30-39 MIN: ICD-10-PCS | Mod: S$GLB,,, | Performed by: INTERNAL MEDICINE

## 2022-09-15 PROCEDURE — 3078F PR MOST RECENT DIASTOLIC BLOOD PRESSURE < 80 MM HG: ICD-10-PCS | Mod: CPTII,S$GLB,, | Performed by: INTERNAL MEDICINE

## 2022-09-15 PROCEDURE — 1159F PR MEDICATION LIST DOCUMENTED IN MEDICAL RECORD: ICD-10-PCS | Mod: CPTII,S$GLB,, | Performed by: INTERNAL MEDICINE

## 2022-09-15 PROCEDURE — 1126F PR PAIN SEVERITY QUANTIFIED, NO PAIN PRESENT: ICD-10-PCS | Mod: CPTII,S$GLB,, | Performed by: INTERNAL MEDICINE

## 2022-09-15 RX ORDER — FLUTICASONE PROPIONATE 50 MCG
2 SPRAY, SUSPENSION (ML) NASAL DAILY
Qty: 16 G | Refills: 0 | Status: SHIPPED | OUTPATIENT
Start: 2022-09-15 | End: 2023-06-13

## 2022-09-15 RX ORDER — METHYLPREDNISOLONE 4 MG/1
TABLET ORAL
Qty: 1 EACH | Refills: 0 | Status: SHIPPED | OUTPATIENT
Start: 2022-09-15 | End: 2023-06-13

## 2022-09-15 RX ORDER — AZITHROMYCIN 250 MG/1
TABLET, FILM COATED ORAL
Qty: 6 TABLET | Refills: 0 | Status: SHIPPED | OUTPATIENT
Start: 2022-09-15 | End: 2022-09-20

## 2022-09-15 RX ORDER — BENZONATATE 200 MG/1
200 CAPSULE ORAL 3 TIMES DAILY PRN
Qty: 30 CAPSULE | Refills: 0 | Status: SHIPPED | OUTPATIENT
Start: 2022-09-15 | End: 2022-09-25

## 2022-09-15 RX ORDER — DOXYCYCLINE HYCLATE 100 MG
100 TABLET ORAL EVERY 12 HOURS
Qty: 14 TABLET | Refills: 0 | Status: CANCELLED | OUTPATIENT
Start: 2022-09-15 | End: 2022-09-22

## 2022-09-15 NOTE — PROGRESS NOTES
"Subjective:      Patient ID: Chuy Bucio is a 66 y.o. male.    Chief Complaint: COVID-19 Concerns    HPI    65 yo with   Patient Active Problem List   Diagnosis    History of herpes simplex infection of genitourinary system    Dermatitis of scalp    Simple chronic bronchitis    Chronic left-sided low back pain without sciatica    At increased risk for hospitalization and death from COVID-19 infection due to unvaccinated state AMA    Dyslipidemia (mild, not requiring statin therapy)    Postural imbalance    Abnormal increased muscle tone    Decreased strength, endurance, and mobility     Past Medical History:   Diagnosis Date    Allergy     Dermatitis of scalp 6/11/2019    Herpes simplex     Simple chronic bronchitis 6/11/2019     Here today c/o.     Cough worsening over past 3 days. Has had some chest congestion. No wheezing or dyspnea.     HA, fever, sore throat resolved.     However continues with drainage in left side of head when lies on his left side.     Review of Systems   Constitutional:  Negative for chills, diaphoresis, fatigue and fever.   HENT:  Positive for sinus pressure. Negative for ear pain and sore throat.    Eyes:  Negative for pain and visual disturbance.   Respiratory:  Positive for cough. Negative for shortness of breath and wheezing.    Cardiovascular:  Negative for chest pain, palpitations and leg swelling.   Gastrointestinal:  Negative for abdominal pain and blood in stool.   Genitourinary:  Negative for dysuria and hematuria.   Skin:  Negative for rash and wound.   Neurological:  Negative for tremors, seizures, syncope, facial asymmetry, speech difficulty, weakness and numbness.   Objective:   /78 (BP Location: Right arm, Patient Position: Sitting, BP Method: Medium (Manual))   Pulse 110   Temp 98.3 °F (36.8 °C) (Tympanic)   Ht 5' 5" (1.651 m)   Wt 60.2 kg (132 lb 11.5 oz)   SpO2 95%   BMI 22.09 kg/m²     Physical Exam  Constitutional:       General: He is awake. He is not " in acute distress.     Appearance: Normal appearance. He is well-developed. He is not ill-appearing.   HENT:      Head: Normocephalic and atraumatic.      Nose:      Right Sinus: Maxillary sinus tenderness and frontal sinus tenderness present.      Left Sinus: Maxillary sinus tenderness and frontal sinus tenderness present.      Mouth/Throat:      Mouth: Mucous membranes are moist.      Pharynx: Posterior oropharyngeal erythema present. No oropharyngeal exudate.   Eyes:      Extraocular Movements: Extraocular movements intact.      Conjunctiva/sclera: Conjunctivae normal.   Neck:      Thyroid: No thyromegaly.   Cardiovascular:      Rate and Rhythm: Normal rate and regular rhythm.   Pulmonary:      Effort: Pulmonary effort is normal.      Breath sounds: Normal breath sounds. No wheezing or rales.   Abdominal:      General: Bowel sounds are normal.      Palpations: Abdomen is soft.      Tenderness: There is no abdominal tenderness.   Musculoskeletal:         General: No swelling.      Cervical back: Normal range of motion and neck supple. No rigidity.   Lymphadenopathy:      Cervical: No cervical adenopathy.   Skin:     General: Skin is warm and dry.   Neurological:      Mental Status: He is alert and oriented to person, place, and time. Mental status is at baseline.      Cranial Nerves: No cranial nerve deficit.      Coordination: Coordination normal.      Gait: Gait normal.   Psychiatric:         Mood and Affect: Mood normal.         Behavior: Behavior normal. Behavior is cooperative.       Assessment:     1. Acute non-recurrent frontal sinusitis    2. Cough      Plan:   Acute non-recurrent frontal sinusitis  -     methylPREDNISolone (MEDROL, CHAYITO,) 4 mg tablet; use as directed  Dispense: 1 each; Refill: 0  -     fluticasone propionate (FLONASE) 50 mcg/actuation nasal spray; 2 sprays (100 mcg total) by Each Nostril route once daily.  Dispense: 16 g; Refill: 0  -     azithromycin (Z-CHAYITO) 250 MG tablet; Take 2 tablets  by mouth on day 1; Take 1 tablet by mouth on days 2-5  Dispense: 6 tablet; Refill: 0    Cough  -     benzonatate (TESSALON) 200 MG capsule; Take 1 capsule (200 mg total) by mouth 3 (three) times daily as needed for Cough.  Dispense: 30 capsule; Refill: 0    ER precautions discussed.       Problem List Items Addressed This Visit    None  Visit Diagnoses       Acute non-recurrent frontal sinusitis    -  Primary    Relevant Medications    methylPREDNISolone (MEDROL, CHAYITO,) 4 mg tablet    fluticasone propionate (FLONASE) 50 mcg/actuation nasal spray    azithromycin (Z-CHAYITO) 250 MG tablet    Cough        Relevant Medications    benzonatate (TESSALON) 200 MG capsule            No follow-ups on file.

## 2022-09-22 DIAGNOSIS — M25.562 LEFT KNEE PAIN, UNSPECIFIED CHRONICITY: Primary | ICD-10-CM

## 2022-09-30 ENCOUNTER — PATIENT MESSAGE (OUTPATIENT)
Dept: RESEARCH | Facility: HOSPITAL | Age: 67
End: 2022-09-30
Payer: MEDICARE

## 2022-10-06 ENCOUNTER — HOSPITAL ENCOUNTER (OUTPATIENT)
Dept: RADIOLOGY | Facility: HOSPITAL | Age: 67
Discharge: HOME | End: 2022-10-06
Attending: STUDENT IN AN ORGANIZED HEALTH CARE EDUCATION/TRAINING PROGRAM
Payer: MEDICARE

## 2022-10-06 ENCOUNTER — OFFICE VISIT (OUTPATIENT)
Dept: SPORTS MEDICINE | Facility: CLINIC | Age: 67
End: 2022-10-06
Payer: MEDICARE

## 2022-10-06 VITALS — WEIGHT: 139.31 LBS | BODY MASS INDEX: 23.21 KG/M2 | HEIGHT: 65 IN

## 2022-10-06 DIAGNOSIS — M76.32 IT BAND SYNDROME, LEFT: Primary | ICD-10-CM

## 2022-10-06 DIAGNOSIS — M25.562 LEFT KNEE PAIN, UNSPECIFIED CHRONICITY: ICD-10-CM

## 2022-10-06 PROCEDURE — 99999 PR PBB SHADOW E&M-EST. PATIENT-LVL III: CPT | Mod: PBBFAC,,, | Performed by: STUDENT IN AN ORGANIZED HEALTH CARE EDUCATION/TRAINING PROGRAM

## 2022-10-06 PROCEDURE — 3008F BODY MASS INDEX DOCD: CPT | Mod: CPTII,S$GLB,, | Performed by: STUDENT IN AN ORGANIZED HEALTH CARE EDUCATION/TRAINING PROGRAM

## 2022-10-06 PROCEDURE — 3008F PR BODY MASS INDEX (BMI) DOCUMENTED: ICD-10-PCS | Mod: CPTII,S$GLB,, | Performed by: STUDENT IN AN ORGANIZED HEALTH CARE EDUCATION/TRAINING PROGRAM

## 2022-10-06 PROCEDURE — 1159F PR MEDICATION LIST DOCUMENTED IN MEDICAL RECORD: ICD-10-PCS | Mod: CPTII,S$GLB,, | Performed by: STUDENT IN AN ORGANIZED HEALTH CARE EDUCATION/TRAINING PROGRAM

## 2022-10-06 PROCEDURE — 97110 THERAPEUTIC EXERCISES: CPT | Mod: GP,S$GLB,, | Performed by: STUDENT IN AN ORGANIZED HEALTH CARE EDUCATION/TRAINING PROGRAM

## 2022-10-06 PROCEDURE — 1159F MED LIST DOCD IN RCRD: CPT | Mod: CPTII,S$GLB,, | Performed by: STUDENT IN AN ORGANIZED HEALTH CARE EDUCATION/TRAINING PROGRAM

## 2022-10-06 PROCEDURE — 99203 OFFICE O/P NEW LOW 30 MIN: CPT | Mod: 25,S$GLB,, | Performed by: STUDENT IN AN ORGANIZED HEALTH CARE EDUCATION/TRAINING PROGRAM

## 2022-10-06 PROCEDURE — 99999 PR PBB SHADOW E&M-EST. PATIENT-LVL III: ICD-10-PCS | Mod: PBBFAC,,, | Performed by: STUDENT IN AN ORGANIZED HEALTH CARE EDUCATION/TRAINING PROGRAM

## 2022-10-06 PROCEDURE — 97110 PR THERAPEUTIC EXERCISES: ICD-10-PCS | Mod: GP,S$GLB,, | Performed by: STUDENT IN AN ORGANIZED HEALTH CARE EDUCATION/TRAINING PROGRAM

## 2022-10-06 PROCEDURE — 99203 PR OFFICE/OUTPT VISIT, NEW, LEVL III, 30-44 MIN: ICD-10-PCS | Mod: 25,S$GLB,, | Performed by: STUDENT IN AN ORGANIZED HEALTH CARE EDUCATION/TRAINING PROGRAM

## 2022-10-06 NOTE — PATIENT INSTRUCTIONS
Assessment:  Chuy Bucio is a 66 y.o. male No chief complaint on file.      No diagnosis found.     Plan:  Apply topical diclofenac (Voltaren) up to 4 times a day to the affected area.  It can be bought over the counter at any local pharmacy.      At least 15 minutes were spent developing, teaching, and performing a home exercise program.  A written summary was provided and all questions were answered. This service was performed by Lisbeth Urbina Sports Medicine Assistant under direction of Allan Celestin MD. CPT 48771-AJ      Follow-up: 6 weeks or sooner if there are any problems between now and then.    Thank you for choosing [facility] and Dr. Allan Celestin for your orthopedic & sports medicine care. It is our goal to provide you with exceptional care that will help keep you healthy, active, and get you back in the game.    Please do not hesitate to reach out to us via email, phone, or MyChart with any questions, concerns, or feedback.    If you felt that you received exemplary care today, please consider leaving us feedback on [employer] at:  [email or web address]    If you are experiencing pain/discomfort ,or have questions after 5pm and would like to be connected to the [facility] on-call team, please call this number and specify which Sports Medicine provider is treating you: [phone]

## 2022-10-06 NOTE — PROGRESS NOTES
"        Patient ID: Chuy Bucio  YOB: 1955  MRN: [id number]    Chief Complaint:  Left lateral knee pain    Referred By: Dr. Noland for Left knee pain    History of Present Illness: Chuy Bucio is a right-hand dominant 66 y.o. male who presents today with 0/10 left knee pain.     The patient is active in none.  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  This began 3 month and Chuy Bucio describes the pain as a intermittent feels like "fluid" in knee and foot. Treatment to date includes PT. They believe that they are unchanged with this treatment. Current pain level at rest is 0/10, pain level at worst is 5/10 and pain level at best is 0/10 (Numeric Pain Rating Scale).  Associated symptoms include: Swelling No, Instability No, Pain that affects your sleep Yes, Mechanical No, locking/catching No, Neurological No, limited range of motion No.    Aggravating activities include sitting cross legged.   Alleviating activities include moving positions.     They admits to formal physical therapy for this. Last physical therapy was 6 months ago and believes that unchanged following this. Previous pertinent orthopedic injuries include back pain.    No results found for: HGBA1C      Past Medical History:   Past Medical History:   Diagnosis Date    Allergy     Dermatitis of scalp 6/11/2019    Herpes simplex     Simple chronic bronchitis 6/11/2019     Past Surgical History:   Procedure Laterality Date    TONSILLECTOMY       Family History   Problem Relation Age of Onset    Cancer Mother         lymphoma    Alzheimer's disease Mother     Arthritis Father     Diabetes Father     Heart disease Father     Stroke Father     Cancer Sister         lymphoma     Social History     Socioeconomic History    Marital status:    Tobacco Use    Smoking status: Never    Smokeless tobacco: " Never   Substance and Sexual Activity    Alcohol use: Never    Drug use: Never    Sexual activity: Yes     Partners: Female     Birth control/protection: Other-see comments     Comment: Too Old     Medication List with Changes/Refills   Current Medications    ACYCLOVIR (ZOVIRAX) 400 MG TABLET    TAKE 1 TABLET(400 MG) BY MOUTH EVERY DAY    FLUTICASONE PROPIONATE (FLONASE) 50 MCG/ACTUATION NASAL SPRAY    2 sprays (100 mcg total) by Each Nostril route once daily.    METHYLPREDNISOLONE (MEDROL, CHAYITO,) 4 MG TABLET    use as directed     Review of patient's allergies indicates:   Allergen Reactions    Clindamycin hcl Other (See Comments)    Penicillin g potassium Other (See Comments)    Tetracyclines Other (See Comments)       Physical Exam:   There is no height or weight on file to calculate BMI.    GENERAL: Well appearing, in no acute distress.  HEAD: Normocephalic and atraumatic.  ENT: External ears and nose grossly normal.  EYES: EOMI bilaterally  PULMONARY: Respirations are grossly even and non-labored.  NEURO: Awake, alert, and oriented x 3.  SKIN: No obvious rashes appreciated.  PSYCH: Mood & affect are appropriate.    Detailed MSK exam:       Left knee exam prominent fibular heads appreciated no tenderness over the fibular head or distal biceps femoris tendon good patellar mobility no effusion appreciated slight decreased quad activation left compared to right  Ligamentously intact  Equivocal Mary Ann's no significant tenderness over the palpation of the distal ITB band but he is the area of concern specifically with the patient is in a figure 4 type position.  LCL appear stable    Imaging:  X-Ray Lumbar Spine 2 Or 3 Views  Narrative: EXAMINATION:  XR LUMBAR SPINE 2 OR 3 VIEWS    CLINICAL HISTORY:  Low back pain, unspecified    TECHNIQUE:  AP, lateral and spot images were performed of the lumbar spine.    COMPARISON:  None    FINDINGS:  The vertebral bodies demonstrate a normal height.  There is straightening of the  normal lordotic curvature.  The disc space heights are well maintained.  Prominent bilateral facet arthropathy seen at L5-S1.  Impression: 1.  As above    Electronically signed by: Alex Swann DO  Date:    02/22/2022  Time:    09:11    Patient deferred any x-rays today    Relevant imaging results were reviewed and interpreted by me and per my read as above.  This was discussed with the patient and / or family today.     Assessment:  Chuy Bucio is a 66 y.o. male presents today for left lateral knee pain most consistent with ITB band friction syndrome.  Does have a history of lower back pain which has had some radicular-type symptoms in the past but seems to correlate only specifically when his in a figure 4 type position.  Difficult to reproduce today in clinic.  Discussed some home exercises stretching that he can do on his own as well as foam rolling for his ITB band as well as topical Voltaren.  If not having improvement or continues have worsening symptoms would recommend formal x-rays of his knee and possible formal physical therapy and a diagnostic ultrasound.  Follow-up as needed.    It band syndrome, left         Allan Celestin MD    Disclaimer: This note was prepared using a voice recognition system and is likely to have sound alike errors within the text.

## 2022-10-19 ENCOUNTER — PATIENT MESSAGE (OUTPATIENT)
Dept: RESEARCH | Facility: HOSPITAL | Age: 67
End: 2022-10-19
Payer: MEDICARE

## 2022-12-20 ENCOUNTER — OFFICE VISIT (OUTPATIENT)
Dept: OPHTHALMOLOGY | Facility: CLINIC | Age: 67
End: 2022-12-20
Payer: MEDICARE

## 2022-12-20 DIAGNOSIS — H26.9 CORTICAL CATARACT: Primary | ICD-10-CM

## 2022-12-20 DIAGNOSIS — H52.7 REFRACTIVE ERROR: ICD-10-CM

## 2022-12-20 PROCEDURE — 92004 COMPRE OPH EXAM NEW PT 1/>: CPT | Mod: S$GLB,,, | Performed by: OPHTHALMOLOGY

## 2022-12-20 PROCEDURE — 92004 PR EYE EXAM, NEW PATIENT,COMPREHESV: ICD-10-PCS | Mod: S$GLB,,, | Performed by: OPHTHALMOLOGY

## 2022-12-20 PROCEDURE — 1159F PR MEDICATION LIST DOCUMENTED IN MEDICAL RECORD: ICD-10-PCS | Mod: CPTII,S$GLB,, | Performed by: OPHTHALMOLOGY

## 2022-12-20 PROCEDURE — 1160F RVW MEDS BY RX/DR IN RCRD: CPT | Mod: CPTII,S$GLB,, | Performed by: OPHTHALMOLOGY

## 2022-12-20 PROCEDURE — 1159F MED LIST DOCD IN RCRD: CPT | Mod: CPTII,S$GLB,, | Performed by: OPHTHALMOLOGY

## 2022-12-20 PROCEDURE — 99999 PR PBB SHADOW E&M-EST. PATIENT-LVL II: ICD-10-PCS | Mod: PBBFAC,,, | Performed by: OPHTHALMOLOGY

## 2022-12-20 PROCEDURE — 1160F PR REVIEW ALL MEDS BY PRESCRIBER/CLIN PHARMACIST DOCUMENTED: ICD-10-PCS | Mod: CPTII,S$GLB,, | Performed by: OPHTHALMOLOGY

## 2022-12-20 PROCEDURE — 99999 PR PBB SHADOW E&M-EST. PATIENT-LVL II: CPT | Mod: PBBFAC,,, | Performed by: OPHTHALMOLOGY

## 2022-12-20 PROCEDURE — 1126F AMNT PAIN NOTED NONE PRSNT: CPT | Mod: CPTII,S$GLB,, | Performed by: OPHTHALMOLOGY

## 2022-12-20 PROCEDURE — 1126F PR PAIN SEVERITY QUANTIFIED, NO PAIN PRESENT: ICD-10-PCS | Mod: CPTII,S$GLB,, | Performed by: OPHTHALMOLOGY

## 2022-12-20 NOTE — PROGRESS NOTES
===============================  Date today is 12/20/2022  Chuy Bucio is a 67 y.o. male  Last visit [facility]: :Visit date not found   Last visit eye dept. Visit date not found    Corrected distance visual acuity was 20/20 in the right eye and 20/20 in the left eye.  Tonometry       Tonometry (Applanation, 9:44 AM)         Right Left    Pressure 14 14                  Wearing Rx       Wearing Rx         Sphere Cylinder Axis    Right -2.25 +2.25 010    Left -2.00 +2.00 175      Type: SVL                  Manifest Refraction       Manifest Refraction         Sphere Cylinder Nettie Dist [facility]    Right -1.50 +2.25 010 20/20    Left -1.75 +2.00 180 20/20                  Not recorded       Chief Complaint   Patient presents with    encounter for eye exam     New Pt to Dr. Abad        HPI     encounter for eye exam     Additional comments: New Pt to Dr. Abad              Comments    No dm  No sx's  Pt has PVD in both eyes (Dr. Rodriguez)          Last edited by Yenni Mcclure on 12/20/2022  9:32 AM.      Problem List Items Addressed This Visit    None  Visit Diagnoses       Cortical cataract    -  Primary    Refractive error              Instructed to call 24/7 for any worsening of vision, visual distortion or pain.  Check OU independently daily.    Gave my office and personal cell phone number.  ________________  12/20/2022 today  Chuy Bucio    1+ central vacuole cataract OU  Sharp disc OU 0.2  Macula looks good OU  IOP ok  No glaucoma  No holes or tears OU  Hx PVD  Discussed Ocuvite, limiting Vit A consumption      RTC 1 year  Instructed to call 24/7 for any worsening of vision or symptoms. Check OU daily.   Gave my office and cell phone number.      =============================

## 2023-02-22 ENCOUNTER — PATIENT MESSAGE (OUTPATIENT)
Dept: INTERNAL MEDICINE | Facility: CLINIC | Age: 68
End: 2023-02-22

## 2023-03-03 ENCOUNTER — PES CALL (OUTPATIENT)
Dept: ADMINISTRATIVE | Facility: CLINIC | Age: 68
End: 2023-03-03
Payer: MEDICARE

## 2023-06-13 ENCOUNTER — OFFICE VISIT (OUTPATIENT)
Dept: INTERNAL MEDICINE | Facility: CLINIC | Age: 68
End: 2023-06-13
Payer: MEDICARE

## 2023-06-13 VITALS
TEMPERATURE: 98 F | OXYGEN SATURATION: 97 % | BODY MASS INDEX: 22.41 KG/M2 | DIASTOLIC BLOOD PRESSURE: 80 MMHG | SYSTOLIC BLOOD PRESSURE: 102 MMHG | HEART RATE: 68 BPM | WEIGHT: 134.5 LBS | HEIGHT: 65 IN

## 2023-06-13 DIAGNOSIS — Z13.6 ENCOUNTER FOR LIPID SCREENING FOR CARDIOVASCULAR DISEASE: ICD-10-CM

## 2023-06-13 DIAGNOSIS — J41.0 SIMPLE CHRONIC BRONCHITIS: ICD-10-CM

## 2023-06-13 DIAGNOSIS — R09.89 PROMINENT ABDOMINAL AORTIC PULSATION: Primary | ICD-10-CM

## 2023-06-13 DIAGNOSIS — Z12.83 SKIN CANCER SCREENING: ICD-10-CM

## 2023-06-13 DIAGNOSIS — A60.00 HERPES SIMPLEX INFECTION OF GENITOURINARY SYSTEM: ICD-10-CM

## 2023-06-13 DIAGNOSIS — L21.9 SEBORRHEIC DERMATITIS: ICD-10-CM

## 2023-06-13 DIAGNOSIS — Z13.220 ENCOUNTER FOR LIPID SCREENING FOR CARDIOVASCULAR DISEASE: ICD-10-CM

## 2023-06-13 DIAGNOSIS — E78.5 DYSLIPIDEMIA: ICD-10-CM

## 2023-06-13 DIAGNOSIS — Z00.00 PREVENTATIVE HEALTH CARE: ICD-10-CM

## 2023-06-13 DIAGNOSIS — Z12.5 SCREENING PSA (PROSTATE SPECIFIC ANTIGEN): ICD-10-CM

## 2023-06-13 DIAGNOSIS — R49.0 HOARSE VOICE QUALITY: ICD-10-CM

## 2023-06-13 LAB
ALBUMIN SERPL BCP-MCNC: 4 G/DL (ref 3.5–5.2)
ALP SERPL-CCNC: 80 U/L (ref 55–135)
ALT SERPL W/O P-5'-P-CCNC: 20 U/L (ref 10–44)
ANION GAP SERPL CALC-SCNC: 6 MMOL/L (ref 8–16)
AST SERPL-CCNC: 24 U/L (ref 10–40)
BILIRUB SERPL-MCNC: 1.3 MG/DL (ref 0.1–1)
BUN SERPL-MCNC: 17 MG/DL (ref 8–23)
CALCIUM SERPL-MCNC: 9.5 MG/DL (ref 8.7–10.5)
CHLORIDE SERPL-SCNC: 106 MMOL/L (ref 95–110)
CHOLEST SERPL-MCNC: 186 MG/DL (ref 120–199)
CHOLEST/HDLC SERPL: 3.3 {RATIO} (ref 2–5)
CO2 SERPL-SCNC: 30 MMOL/L (ref 23–29)
CREAT SERPL-MCNC: 0.9 MG/DL (ref 0.5–1.4)
EST. GFR  (NO RACE VARIABLE): >60 ML/MIN/1.73 M^2
GLUCOSE SERPL-MCNC: 88 MG/DL (ref 70–110)
HDLC SERPL-MCNC: 56 MG/DL (ref 40–75)
HDLC SERPL: 30.1 % (ref 20–50)
LDLC SERPL CALC-MCNC: 112.8 MG/DL (ref 63–159)
NONHDLC SERPL-MCNC: 130 MG/DL
POTASSIUM SERPL-SCNC: 4.1 MMOL/L (ref 3.5–5.1)
PROT SERPL-MCNC: 6.9 G/DL (ref 6–8.4)
SODIUM SERPL-SCNC: 142 MMOL/L (ref 136–145)
TRIGL SERPL-MCNC: 86 MG/DL (ref 30–150)

## 2023-06-13 PROCEDURE — 99999 PR PBB SHADOW E&M-EST. PATIENT-LVL V: ICD-10-PCS | Mod: PBBFAC,,, | Performed by: FAMILY MEDICINE

## 2023-06-13 PROCEDURE — 1126F AMNT PAIN NOTED NONE PRSNT: CPT | Mod: CPTII,S$GLB,, | Performed by: FAMILY MEDICINE

## 2023-06-13 PROCEDURE — 3008F PR BODY MASS INDEX (BMI) DOCUMENTED: ICD-10-PCS | Mod: CPTII,S$GLB,, | Performed by: FAMILY MEDICINE

## 2023-06-13 PROCEDURE — 1101F PR PT FALLS ASSESS DOC 0-1 FALLS W/OUT INJ PAST YR: ICD-10-PCS | Mod: CPTII,S$GLB,, | Performed by: FAMILY MEDICINE

## 2023-06-13 PROCEDURE — 1160F RVW MEDS BY RX/DR IN RCRD: CPT | Mod: CPTII,S$GLB,, | Performed by: FAMILY MEDICINE

## 2023-06-13 PROCEDURE — 3288F PR FALLS RISK ASSESSMENT DOCUMENTED: ICD-10-PCS | Mod: CPTII,S$GLB,, | Performed by: FAMILY MEDICINE

## 2023-06-13 PROCEDURE — 80053 COMPREHEN METABOLIC PANEL: CPT | Performed by: FAMILY MEDICINE

## 2023-06-13 PROCEDURE — 3074F SYST BP LT 130 MM HG: CPT | Mod: CPTII,S$GLB,, | Performed by: FAMILY MEDICINE

## 2023-06-13 PROCEDURE — 99214 PR OFFICE/OUTPT VISIT, EST, LEVL IV, 30-39 MIN: ICD-10-PCS | Mod: S$GLB,,, | Performed by: FAMILY MEDICINE

## 2023-06-13 PROCEDURE — 3288F FALL RISK ASSESSMENT DOCD: CPT | Mod: CPTII,S$GLB,, | Performed by: FAMILY MEDICINE

## 2023-06-13 PROCEDURE — 84153 ASSAY OF PSA TOTAL: CPT | Performed by: FAMILY MEDICINE

## 2023-06-13 PROCEDURE — 3079F DIAST BP 80-89 MM HG: CPT | Mod: CPTII,S$GLB,, | Performed by: FAMILY MEDICINE

## 2023-06-13 PROCEDURE — 1159F MED LIST DOCD IN RCRD: CPT | Mod: CPTII,S$GLB,, | Performed by: FAMILY MEDICINE

## 2023-06-13 PROCEDURE — 1101F PT FALLS ASSESS-DOCD LE1/YR: CPT | Mod: CPTII,S$GLB,, | Performed by: FAMILY MEDICINE

## 2023-06-13 PROCEDURE — 99214 OFFICE O/P EST MOD 30 MIN: CPT | Mod: S$GLB,,, | Performed by: FAMILY MEDICINE

## 2023-06-13 PROCEDURE — 1126F PR PAIN SEVERITY QUANTIFIED, NO PAIN PRESENT: ICD-10-PCS | Mod: CPTII,S$GLB,, | Performed by: FAMILY MEDICINE

## 2023-06-13 PROCEDURE — 3074F PR MOST RECENT SYSTOLIC BLOOD PRESSURE < 130 MM HG: ICD-10-PCS | Mod: CPTII,S$GLB,, | Performed by: FAMILY MEDICINE

## 2023-06-13 PROCEDURE — 80061 LIPID PANEL: CPT | Performed by: FAMILY MEDICINE

## 2023-06-13 PROCEDURE — 1159F PR MEDICATION LIST DOCUMENTED IN MEDICAL RECORD: ICD-10-PCS | Mod: CPTII,S$GLB,, | Performed by: FAMILY MEDICINE

## 2023-06-13 PROCEDURE — 3079F PR MOST RECENT DIASTOLIC BLOOD PRESSURE 80-89 MM HG: ICD-10-PCS | Mod: CPTII,S$GLB,, | Performed by: FAMILY MEDICINE

## 2023-06-13 PROCEDURE — 1160F PR REVIEW ALL MEDS BY PRESCRIBER/CLIN PHARMACIST DOCUMENTED: ICD-10-PCS | Mod: CPTII,S$GLB,, | Performed by: FAMILY MEDICINE

## 2023-06-13 PROCEDURE — 99999 PR PBB SHADOW E&M-EST. PATIENT-LVL V: CPT | Mod: PBBFAC,,, | Performed by: FAMILY MEDICINE

## 2023-06-13 PROCEDURE — 3008F BODY MASS INDEX DOCD: CPT | Mod: CPTII,S$GLB,, | Performed by: FAMILY MEDICINE

## 2023-06-13 RX ORDER — ACYCLOVIR 400 MG/1
400 TABLET ORAL DAILY
Qty: 90 TABLET | Refills: 4 | Status: SHIPPED | OUTPATIENT
Start: 2023-06-13

## 2023-06-13 RX ORDER — KETOCONAZOLE 20 MG/ML
SHAMPOO, SUSPENSION TOPICAL
Qty: 240 ML | Refills: 2 | Status: SHIPPED | OUTPATIENT
Start: 2023-06-13

## 2023-06-13 RX ORDER — HYDROCORTISONE 25 MG/G
CREAM TOPICAL
Qty: 20 G | Refills: 0 | Status: SHIPPED | OUTPATIENT
Start: 2023-06-13

## 2023-06-13 RX ORDER — KETOCONAZOLE 20 MG/G
CREAM TOPICAL 2 TIMES DAILY
Qty: 30 G | Refills: 1 | Status: SHIPPED | OUTPATIENT
Start: 2023-06-13 | End: 2023-06-27

## 2023-06-13 RX ORDER — CHOLECALCIFEROL (VITAMIN D3) 25 MCG
1000 TABLET ORAL DAILY
COMMUNITY
Start: 2023-06-13

## 2023-06-13 NOTE — PROGRESS NOTES
"OFFICE VISIT 6/13/23  9:20 AM CDT    Subjective   CHIEF COMPLAINT: Follow-up    A new problem, incidentally noted on the exam today, is prominent abdominal aortic pulsation. He reports no associated symptoms and has noticed it for a while. He agreed to have an ultrasound to evaluate for an abdominal aortic aneurysm.    Another new problem reported is a chronic recurrent hoarse quality of voice, which is not seasonal and he does not believe is related to allergies. He agreed to an ENT consultation for further evaluation.    A lab order was placed to evaluate the status of his dyslipidemia.    There is a mild worsening of his seborrheic dermatitis, now extending to his face. It previously responded well to ketoconazole.    He wants to continue acyclovir for genital herpes suppression.      Review of Systems   Constitutional:  Negative for activity change and unexpected weight change.   HENT:  Negative for hearing loss, rhinorrhea and trouble swallowing.    Eyes:  Negative for discharge and visual disturbance.   Respiratory:  Negative for chest tightness and wheezing.    Cardiovascular:  Negative for palpitations.   Gastrointestinal:  Negative for blood in stool, constipation and diarrhea.   Endocrine: Negative for polydipsia and polyuria.   Genitourinary:  Negative for difficulty urinating, hematuria and urgency.   Psychiatric/Behavioral:  Negative for confusion and dysphoric mood.        Objective   Vitals:    06/13/23 0935   BP: 102/80   BP Location: Left arm   Patient Position: Sitting   BP Method: Large (Manual)   Pulse: 68   Temp: 98.2 °F (36.8 °C)   SpO2: 97%   Weight: 61 kg (134 lb 7.7 oz)   Height: 5' 5" (1.651 m)   Physical Exam  Vitals reviewed.   Constitutional:       General: He is not in acute distress.     Appearance: Normal appearance. He is not ill-appearing, toxic-appearing or diaphoretic.   HENT:      Head: Normocephalic and atraumatic.   Eyes:      General: No scleral icterus.     " Conjunctiva/sclera: Conjunctivae normal.   Neck:      Vascular: No carotid bruit.   Cardiovascular:      Rate and Rhythm: Normal rate and regular rhythm.   Pulmonary:      Effort: Pulmonary effort is normal.      Breath sounds: Normal breath sounds.   Abdominal:      General: Bowel sounds are normal.      Palpations: Abdomen is soft.      Tenderness: There is no abdominal tenderness.      Comments: Prominent AA pulsation.   Skin:     General: Skin is warm and dry.   Neurological:      Mental Status: He is alert and oriented to person, place, and time. Mental status is at baseline.   Psychiatric:         Mood and Affect: Mood normal.         Behavior: Behavior normal.         Judgment: Judgment normal.        Assessment and Plan   1. Prominent abdominal aortic pulsation  -     US Abdominal Aorta; Future; Expected date: 08/09/2023    2. Hoarse voice quality  -     Ambulatory referral/consult to ENT; Future; Expected date: 06/20/2023    3. Dyslipidemia (mild, not requiring statin therapy)  -     Lipid Panel; Future; Expected date: 06/13/2023    4. Seborrheic dermatitis  -     ketoconazole (NIZORAL) 2 % cream; Apply topically 2 (two) times daily. for 14 days  Dispense: 30 g; Refill: 1  -     ketoconazole (NIZORAL) 2 % shampoo; Apply to a clean scalp and skin under facial hair twice weekly for 4 weeks.  Dispense: 240 mL; Refill: 2  -     hydrocortisone 2.5 % cream; Apply SPARINGLY to areas of skin irritation twice daily as needed (Use only as needed, max use 5 days.)  Dispense: 20 g; Refill: 0    5. Preventative health care  -     PSA, Screening; Future; Expected date: 06/13/2023  -     Lipid Panel; Future; Expected date: 06/13/2023  -     Comprehensive Metabolic Panel; Future; Expected date: 06/13/2023  -     vitamin D (VITAMIN D3) 1000 units Tab; Take 1 tablet (1,000 Units total) by mouth once daily.    6. History of herpes simplex infection of genitourinary system  -     acyclovir (ZOVIRAX) 400 MG tablet; Take 1  "tablet (400 mg total) by mouth once daily.  Dispense: 90 tablet; Refill: 4    7. Encounter for lipid screening for cardiovascular disease  -     Lipid Panel; Future; Expected date: 06/13/2023    8. Screening PSA (prostate specific antigen)  -     PSA, Screening; Future; Expected date: 06/13/2023    9. Simple chronic bronchitis  Assessment & Plan:  This is a chronic problem that appears compensated/controlled and stable. He feels that the severity of symptoms do not warrant further evaluation or treatment.      10. Skin cancer screening  -     Ambulatory referral/consult to Dermatology; Future; Expected date: 06/20/2023    Unless noted herein, any chronic conditions are represented as and appear stable, and no other significant complaints or concerns were reported.    Follow up in about 1 year (around 6/13/2024) for annual wellness exam.        Documentation entered by me for this encounter may have been done in part using speech-recognition technology. Although I have made an effort to ensure accuracy, "sound like" errors may exist and should be interpreted in context.  "

## 2023-06-14 LAB — COMPLEXED PSA SERPL-MCNC: 0.23 NG/ML (ref 0–4)

## 2023-06-19 ENCOUNTER — OFFICE VISIT (OUTPATIENT)
Dept: OTOLARYNGOLOGY | Facility: CLINIC | Age: 68
End: 2023-06-19
Payer: MEDICARE

## 2023-06-19 VITALS
SYSTOLIC BLOOD PRESSURE: 123 MMHG | DIASTOLIC BLOOD PRESSURE: 86 MMHG | BODY MASS INDEX: 22.51 KG/M2 | TEMPERATURE: 98 F | HEIGHT: 65 IN | HEART RATE: 79 BPM | WEIGHT: 135.13 LBS

## 2023-06-19 DIAGNOSIS — R49.0 HOARSE VOICE QUALITY: ICD-10-CM

## 2023-06-19 DIAGNOSIS — R49.0 DYSPHONIA: ICD-10-CM

## 2023-06-19 DIAGNOSIS — K21.9 LARYNGOPHARYNGEAL REFLUX (LPR): Primary | ICD-10-CM

## 2023-06-19 PROCEDURE — 3079F PR MOST RECENT DIASTOLIC BLOOD PRESSURE 80-89 MM HG: ICD-10-PCS | Mod: CPTII,S$GLB,, | Performed by: OTOLARYNGOLOGY

## 2023-06-19 PROCEDURE — 1126F PR PAIN SEVERITY QUANTIFIED, NO PAIN PRESENT: ICD-10-PCS | Mod: CPTII,S$GLB,, | Performed by: OTOLARYNGOLOGY

## 2023-06-19 PROCEDURE — 1126F AMNT PAIN NOTED NONE PRSNT: CPT | Mod: CPTII,S$GLB,, | Performed by: OTOLARYNGOLOGY

## 2023-06-19 PROCEDURE — 1101F PR PT FALLS ASSESS DOC 0-1 FALLS W/OUT INJ PAST YR: ICD-10-PCS | Mod: CPTII,S$GLB,, | Performed by: OTOLARYNGOLOGY

## 2023-06-19 PROCEDURE — 3079F DIAST BP 80-89 MM HG: CPT | Mod: CPTII,S$GLB,, | Performed by: OTOLARYNGOLOGY

## 2023-06-19 PROCEDURE — 31575 DIAGNOSTIC LARYNGOSCOPY: CPT | Mod: S$GLB,,, | Performed by: OTOLARYNGOLOGY

## 2023-06-19 PROCEDURE — 99999 PR PBB SHADOW E&M-EST. PATIENT-LVL III: ICD-10-PCS | Mod: PBBFAC,,, | Performed by: OTOLARYNGOLOGY

## 2023-06-19 PROCEDURE — 1101F PT FALLS ASSESS-DOCD LE1/YR: CPT | Mod: CPTII,S$GLB,, | Performed by: OTOLARYNGOLOGY

## 2023-06-19 PROCEDURE — 3008F BODY MASS INDEX DOCD: CPT | Mod: CPTII,S$GLB,, | Performed by: OTOLARYNGOLOGY

## 2023-06-19 PROCEDURE — 3074F PR MOST RECENT SYSTOLIC BLOOD PRESSURE < 130 MM HG: ICD-10-PCS | Mod: CPTII,S$GLB,, | Performed by: OTOLARYNGOLOGY

## 2023-06-19 PROCEDURE — 3008F PR BODY MASS INDEX (BMI) DOCUMENTED: ICD-10-PCS | Mod: CPTII,S$GLB,, | Performed by: OTOLARYNGOLOGY

## 2023-06-19 PROCEDURE — 99204 OFFICE O/P NEW MOD 45 MIN: CPT | Mod: 25,S$GLB,, | Performed by: OTOLARYNGOLOGY

## 2023-06-19 PROCEDURE — 1159F PR MEDICATION LIST DOCUMENTED IN MEDICAL RECORD: ICD-10-PCS | Mod: CPTII,S$GLB,, | Performed by: OTOLARYNGOLOGY

## 2023-06-19 PROCEDURE — 3288F FALL RISK ASSESSMENT DOCD: CPT | Mod: CPTII,S$GLB,, | Performed by: OTOLARYNGOLOGY

## 2023-06-19 PROCEDURE — 31575 PR LARYNGOSCOPY, FLEXIBLE; DIAGNOSTIC: ICD-10-PCS | Mod: S$GLB,,, | Performed by: OTOLARYNGOLOGY

## 2023-06-19 PROCEDURE — 3288F PR FALLS RISK ASSESSMENT DOCUMENTED: ICD-10-PCS | Mod: CPTII,S$GLB,, | Performed by: OTOLARYNGOLOGY

## 2023-06-19 PROCEDURE — 99999 PR PBB SHADOW E&M-EST. PATIENT-LVL III: CPT | Mod: PBBFAC,,, | Performed by: OTOLARYNGOLOGY

## 2023-06-19 PROCEDURE — 99204 PR OFFICE/OUTPT VISIT, NEW, LEVL IV, 45-59 MIN: ICD-10-PCS | Mod: 25,S$GLB,, | Performed by: OTOLARYNGOLOGY

## 2023-06-19 PROCEDURE — 3074F SYST BP LT 130 MM HG: CPT | Mod: CPTII,S$GLB,, | Performed by: OTOLARYNGOLOGY

## 2023-06-19 PROCEDURE — 1159F MED LIST DOCD IN RCRD: CPT | Mod: CPTII,S$GLB,, | Performed by: OTOLARYNGOLOGY

## 2023-06-19 RX ORDER — PANTOPRAZOLE SODIUM 40 MG/1
40 TABLET, DELAYED RELEASE ORAL DAILY
Qty: 30 TABLET | Refills: 5 | Status: SHIPPED | OUTPATIENT
Start: 2023-06-19 | End: 2023-10-02 | Stop reason: SDUPTHER

## 2023-06-19 NOTE — PROGRESS NOTES
"Referring Provider:    ASHLEY Marie Md  [id number] [facility]  [place]  [place] [address]  Subjective:   Patient: Chuy Bucio [id number], :1955   Visit date:2023 10:00 AM    Chief Complaint:  Sore Throat    HPI:    Prior notes reviewed by myself.  Clinical documentation obtained by nursing staff reviewed.     68 y/o gentleman here for evaluation of hoarseness.  He has noticed this since having COVID 19 roughly 2 years ago when he developed a severe sore throat.  He has been noticing globus sensation quality issues especially in the afternoons and also when he is using his singing voice.  He has been a professional musician for many years.  He denies any acute episodes of heartburn or acid indigestion, but he does admit to frequent throat clearing.  He has seasonal allergy symptoms including rhinorrhea, congestion, sneezing but these have not increased over the past few years.      Objective:     Physical Exam:  Vitals:  /86 (BP Location: Left arm, Patient Position: Sitting)   Pulse 79   Temp 98.1 °F (36.7 °C)   Ht 5' 5" (1.651 m)   Wt 61.3 kg (135 lb 2.3 oz)   BMI 22.49 kg/m²   General appearance:  Well developed, well nourished    Ears:  Otoscopy of external auditory canals and tympanic membranes was normal, clinical speech reception thresholds grossly intact, no mass/lesion of auricle.    Nose:  No masses/lesions of external nose, mildly congested nasal mucosa, septum, and turbinates were within normal limits.    Mouth:  No mass/lesion of lips, teeth, gums, hard/soft palate, tongue, tonsils, or oropharynx.    Neck & Lymphatics:  No cervical lymphadenopathy, no neck mass/crepitus/ asymmetry, trachea is midline, no thyroid enlargement/tenderness/mass.    Due to indication in patient's history, presentation or risk factors,  a fiber optic exam was performed.    SEPARATE PROCEDURE NOTE:    ANESTHESIA:  Topical xylocaine with vicky-synephrine  FINDINGS:  Moderate  inaterarytenoid erythema and " edema    PROCEDURE:  After verbal consent was obtained, the flexible scope was passed through the patient's nasal cavity without difficulty.  The nasopharynx (adenoid pad) and eustachian tube orifices were first visualized and were found to be normal, without masses or irregularity.  The posterior pharyngeal wall and base of tongue were then examined and no mass or irregular tissue was seen.  The scope was then advanced to the larynx, and the epiglottis, valleculae, and piriform sinuses were normal, without masses or mucosal irregularity.  The false vocal folds and true vocal folds were then examined and were found to have normal mobility (full abduction and adduction) and no masses or mucosal irregularity was seen.  The interartyenoid area had moderate  edema and erythema consistent with reflux.      [x]  Data Reviewed:    Lab Results   Component Value Date    WBC 5.78 08/05/2019    HGB 15.2 08/05/2019    HCT 47.5 08/05/2019    MCV 94 08/05/2019    EOSINOPHIL 1.0 08/05/2019               Assessment & Plan:   Laryngopharyngeal reflux (LPR)  -     pantoprazole (PROTONIX) 40 MG tablet; Take 1 tablet (40 mg total) by mouth once daily.  Dispense: 30 tablet; Refill: 5    Hoarse voice quality  -     Ambulatory referral/consult to ENT      His endoscopy findings and history are consistent with laryngopharyngeal reflux.  I do not see any other pathology on endoscopy.  I recommended that we start him on a therapeutic trial of Protonix once daily and also attempt to adhere to some therapeutic lifestyle changes and vocal hygiene recommendations.  He will follow-up in 8 weeks for re-evaluation.    Laryngopharyngeal Reflux (LPR) Patient Information and Medication Instructions    LPR (laryngopharyngeal reflux) can be a challenging condition to control.  Some patients require once daily medication like a proton pump inhibitor to control their symptoms (such as Omeprazole, Pantoprazole, Esomeprazole).  HOWEVER, other patients will  require taking this medication twice daily and even may be started on another medication called an H2 blocker (such as Pepcid, Zantac) at bedtime.    It is important that medication is not the only technique that you use to help control your LPR.  Therapeutic lifestyle changes are very important as well:     Weight Loss:  If you are overweight, losing weight can significantly reduce your reflux.  Diet Control:  It is important to determine what your Trigger foods for reflux are.  Limiting your intake of these foods will significantly improve your reflux.  Examples of foods known to increase reflux are listed below  Carbonated beverages  Caffeine (this includes Coffee, soda, iced tea, energy drinks etc.)  Alcohol  Fried/ fatty/ greasy foods  Acidic foods (citrus, tomato etc.)  Chocolate  Spearmint/Peppermint  Elevating the head of your bed:  This doesn't mean more pillows.  You need to actually elevate the head of your bed.  Some patients have found something to put under the legs of the head of their bed.  Other patients have employed a wedge or an air bladder of some sort to elevate the head of their bed.  This makes a significant difference with reflux and can also help with nasal congestion.  Eating before bedtime:  Try not to eat anything for at least 2 hours before bedtime.  This allows for less material to remain in your stomach when you lay down at night which equals less severe reflux.  More information:  If you would like to read more about diet changes and lifestyle changes that you can employ that will help with your reflux, the books below may be helpful.  Dropping Acid:  The Reflux diet Cookbook & Cure by Bolivar Willard M.D. and Anjel Montgomery M.D.  The Acid Watcher Diet:  A 28 Day Reflux Prevention and Healing Program by Joao Hernandez M.D.      Weaning Instructions After Symptom Improvement    It can sometimes take up to 12 weeks to see symptom improvement in LPR.  The medications may reduce the amount of  acid you are producing very quickly, but then the tissues of your voice box and upper throat have to heal themselves.  Your physician may have increased year proton pump inhibitor to twice daily dosing and even may have added an H2 blocker at bedtime.  Eventually, the goal is a complete resolution of your symptoms.  Hopefully you have been working on the lifestyle modifications listed above over this time period as well!    Once your symptoms have improved, our goal is to wean you back off of your reflux medication.  The instructions below will help guide you through this process.    Take all anti-reflux medications for at least 3 weeks beyond when your symptoms have improved/resolved  If you are on Twice daily dosing of a proton pump inhibitor (omeprazole, pantoprazole, esomeprazole etc.) and an H2 blocker at bedtime (pepcid, zantac) then you should first discontinue your night time dose of your proton pump inhibitor.  If your symptoms are still controlled after 3 weeks of taking your PPI in the morning and your H2 blocker at bedtime,  discontinue your bedtime H2 blocker  If your symptoms are still controlled after 3 more weeks of only taking your PPI in the morning, discontinue your morning PPI    If at any point your symptoms return during this weaning process, you can return to the previous step and let your physician know at the next appointment.

## 2023-06-26 ENCOUNTER — HOSPITAL ENCOUNTER (OUTPATIENT)
Dept: RADIOLOGY | Facility: HOSPITAL | Age: 68
Discharge: HOME | End: 2023-06-26
Attending: FAMILY MEDICINE
Payer: MEDICARE

## 2023-06-26 DIAGNOSIS — R09.89 PROMINENT ABDOMINAL AORTIC PULSATION: ICD-10-CM

## 2023-06-26 PROCEDURE — 76775 US EXAM ABDO BACK WALL LIM: CPT | Mod: 26,,, | Performed by: RADIOLOGY

## 2023-06-26 PROCEDURE — 76775 US ABDOMINAL AORTA: ICD-10-PCS | Mod: 26,,, | Performed by: RADIOLOGY

## 2023-06-26 PROCEDURE — 76775 US EXAM ABDO BACK WALL LIM: CPT | Mod: TC

## 2023-07-11 ENCOUNTER — PES CALL (OUTPATIENT)
Dept: ADMINISTRATIVE | Facility: CLINIC | Age: 68
End: 2023-07-11
Payer: MEDICARE

## 2023-09-07 ENCOUNTER — OFFICE VISIT (OUTPATIENT)
Dept: DERMATOLOGY | Facility: CLINIC | Age: 68
End: 2023-09-07
Payer: MEDICARE

## 2023-09-07 DIAGNOSIS — D18.01 HEMANGIOMA OF SKIN: ICD-10-CM

## 2023-09-07 DIAGNOSIS — L21.9 SEBORRHEIC DERMATITIS: ICD-10-CM

## 2023-09-07 DIAGNOSIS — Z12.83 SKIN CANCER SCREENING: ICD-10-CM

## 2023-09-07 DIAGNOSIS — Z12.83 SCREENING, MALIGNANT NEOPLASM, SKIN: ICD-10-CM

## 2023-09-07 DIAGNOSIS — L57.0 ACTINIC KERATOSES: ICD-10-CM

## 2023-09-07 DIAGNOSIS — L82.1 SEBORRHEIC KERATOSIS: Primary | ICD-10-CM

## 2023-09-07 PROCEDURE — 1101F PT FALLS ASSESS-DOCD LE1/YR: CPT | Mod: CPTII,S$GLB,, | Performed by: DERMATOLOGY

## 2023-09-07 PROCEDURE — 17000 DESTRUCT PREMALG LESION: CPT | Mod: S$GLB,,, | Performed by: DERMATOLOGY

## 2023-09-07 PROCEDURE — 99214 OFFICE O/P EST MOD 30 MIN: CPT | Mod: 25,S$GLB,, | Performed by: DERMATOLOGY

## 2023-09-07 PROCEDURE — 1101F PR PT FALLS ASSESS DOC 0-1 FALLS W/OUT INJ PAST YR: ICD-10-PCS | Mod: CPTII,S$GLB,, | Performed by: DERMATOLOGY

## 2023-09-07 PROCEDURE — 1126F AMNT PAIN NOTED NONE PRSNT: CPT | Mod: CPTII,S$GLB,, | Performed by: DERMATOLOGY

## 2023-09-07 PROCEDURE — 1126F PR PAIN SEVERITY QUANTIFIED, NO PAIN PRESENT: ICD-10-PCS | Mod: CPTII,S$GLB,, | Performed by: DERMATOLOGY

## 2023-09-07 PROCEDURE — 1159F PR MEDICATION LIST DOCUMENTED IN MEDICAL RECORD: ICD-10-PCS | Mod: CPTII,S$GLB,, | Performed by: DERMATOLOGY

## 2023-09-07 PROCEDURE — 17003 DESTRUCT PREMALG LES 2-14: CPT | Mod: S$GLB,,, | Performed by: DERMATOLOGY

## 2023-09-07 PROCEDURE — 3288F FALL RISK ASSESSMENT DOCD: CPT | Mod: CPTII,S$GLB,, | Performed by: DERMATOLOGY

## 2023-09-07 PROCEDURE — 3288F PR FALLS RISK ASSESSMENT DOCUMENTED: ICD-10-PCS | Mod: CPTII,S$GLB,, | Performed by: DERMATOLOGY

## 2023-09-07 PROCEDURE — 1160F RVW MEDS BY RX/DR IN RCRD: CPT | Mod: CPTII,S$GLB,, | Performed by: DERMATOLOGY

## 2023-09-07 PROCEDURE — 17000 PR DESTRUCTION(LASER SURGERY,CRYOSURGERY,CHEMOSURGERY),PREMALIGNANT LESIONS,FIRST LESION: ICD-10-PCS | Mod: S$GLB,,, | Performed by: DERMATOLOGY

## 2023-09-07 PROCEDURE — 1160F PR REVIEW ALL MEDS BY PRESCRIBER/CLIN PHARMACIST DOCUMENTED: ICD-10-PCS | Mod: CPTII,S$GLB,, | Performed by: DERMATOLOGY

## 2023-09-07 PROCEDURE — 1159F MED LIST DOCD IN RCRD: CPT | Mod: CPTII,S$GLB,, | Performed by: DERMATOLOGY

## 2023-09-07 PROCEDURE — 17003 DESTRUCTION, PREMALIGNANT LESIONS; SECOND THROUGH 14 LESIONS: ICD-10-PCS | Mod: S$GLB,,, | Performed by: DERMATOLOGY

## 2023-09-07 PROCEDURE — 99999 PR PBB SHADOW E&M-EST. PATIENT-LVL III: ICD-10-PCS | Mod: PBBFAC,,, | Performed by: DERMATOLOGY

## 2023-09-07 PROCEDURE — 99214 PR OFFICE/OUTPT VISIT, EST, LEVL IV, 30-39 MIN: ICD-10-PCS | Mod: 25,S$GLB,, | Performed by: DERMATOLOGY

## 2023-09-07 PROCEDURE — 99999 PR PBB SHADOW E&M-EST. PATIENT-LVL III: CPT | Mod: PBBFAC,,, | Performed by: DERMATOLOGY

## 2023-09-07 RX ORDER — CLOBETASOL PROPIONATE 0.05 MG/G
GEL TOPICAL
Qty: 60 G | Refills: 3 | Status: SHIPPED | OUTPATIENT
Start: 2023-09-07

## 2023-09-07 NOTE — PROGRESS NOTES
Subjective:      Patient ID:  Chuy Bucio is a 67 y.o. male who presents for   Chief Complaint   Patient presents with    Skin Check     Face is starting to feel scaly and little red marks all over legs     Cyst     On back starting to itch and ache alot     Hx of NMSC of the right cheek, AK's, and seb derm, last seen on 5/4/22 by Dr. Clifton.  He c/o several scaly areas of the bilateral cheeks. No tx tried.          Review of Systems   Constitutional:  Negative for fever and chills.   Gastrointestinal:  Negative for nausea and vomiting.   Skin:  Positive for activity-related sunscreen use. Negative for daily sunscreen use and recent sunburn.   Hematologic/Lymphatic: Does not bruise/bleed easily.       Objective:   Physical Exam   Constitutional: He appears well-developed and well-nourished. No distress.   Neurological: He is alert and oriented to person, place, and time. He is not disoriented.   Psychiatric: He has a normal mood and affect.   Skin:   Areas Examined (abnormalities noted in diagram):   Scalp / Hair Palpated and Inspected  Head / Face Inspection Performed  Neck Inspection Performed  Chest / Axilla Inspection Performed  Abdomen Inspection Performed  Back Inspection Performed  RUE Inspected  LUE Inspection Performed  RLE Inspected  LLE Inspection Performed  Nails and Digits Inspection Performed                 Diagram Legend     Erythematous scaling macule/papule c/w actinic keratosis       Vascular papule c/w angioma      Pigmented verrucoid papule/plaque c/w seborrheic keratosis      Yellow umbilicated papule c/w sebaceous hyperplasia      Irregularly shaped tan macule c/w lentigo     1-2 mm smooth white papules consistent with Milia      Movable subcutaneous cyst with punctum c/w epidermal inclusion cyst      Subcutaneous movable cyst c/w pilar cyst      Firm pink to brown papule c/w dermatofibroma      Pedunculated fleshy papule(s) c/w skin tag(s)      Evenly pigmented macule c/w junctional  nevus     Mildly variegated pigmented, slightly irregular-bordered macule c/w mildly atypical nevus      Flesh colored to evenly pigmented papule c/w intradermal nevus       Pink pearly papule/plaque c/w basal cell carcinoma      Erythematous hyperkeratotic cursted plaque c/w SCC      Surgical scar with no sign of skin cancer recurrence      Open and closed comedones      Inflammatory papules and pustules      Verrucoid papule consistent consistent with wart     Erythematous eczematous patches and plaques     Dystrophic onycholytic nail with subungual debris c/w onychomycosis     Umbilicated papule    Erythematous-base heme-crusted tan verrucoid plaque consistent with inflamed seborrheic keratosis     Erythematous Silvery Scaling Plaque c/w Psoriasis     See annotation      Assessment / Plan:        Seborrheic keratosis  Reassurance given. Discussed diagnosis and that lesions are benign.  AAD handout given.     Skin cancer screening  -     Ambulatory referral/consult to Dermatology    Hemangioma of skin  Reassurance given.  Lesions are benign.    Screening, malignant neoplasm, skin  Actinic keratoses  Cryosurgery Procedure Note    The patient is informed of the precancerous quality and need for treatment of these lesions. After risks, benefits and alternatives explained, including blistering, pain, hyper- and hypopigmentation, patient verbally consents to cryotherapy to precancerous lesions. Liquid nitrogen cryosurgery is applied to the 4 actinic keratoses, as detailed in the physical exam, to produce a freeze injury. The patient is aware that blisters may form and is instructed on wound care with gentle cleansing and use of vaseline ointment to keep moist until healed. The patient is supplied a handout on cryosurgery and is instructed to call if lesions do not completely resolve.             Follow up if symptoms worsen or fail to improve.

## 2023-09-07 NOTE — PATIENT INSTRUCTIONS
CRYOSURGERY      Your doctor has used a method called cryosurgery to treat your skin condition. Cryosurgery refers to the use of very cold substances to treat a variety of skin conditions such as warts, pre-skin cancers, molluscum contagiosum, sun spots, and several benign growths. The substance we use in cryosurgery is liquid nitrogen and is so cold (-195 degrees Celsius) that is burns when administered.     Following treatment in the office, the skin may immediately burn and become red. You may find the area around the lesion is affected as well. It is sometimes necessary to treat not only the lesion, but a small area of the surrounding normal skin to achieve a good response.     A blister, and even a blood filled blister, may form after treatment.   This is a normal response. If the blister is painful, it is acceptable to sterilize a needle and with rubbing alcohol and gently pop the blister. It is important that you gently wash the area with soap and warm water as the blister fluid may contain wart virus if a wart was treated. Do no remove the roof of the blister.     The area treated can take anywhere from 1-3 weeks to heal. Healing time depends on the kind of skin lesion treated, the location, and how aggressively the lesion was treated. It is recommended that the areas treated are covered with Vaseline or bacitracin ointment and a band-aid. If a band-aid is not practical, just ointment applied several times per day will do. Keeping these areas moist will speed the healing time.    Treatment with liquid nitrogen can leave a scar. In dark skin, it may be a light or dark scar, in light skin it may be a white or pink scar. These will generally fade with time.    If you have any concerns after your treatment, please feel free to call the office.         [facility] DERMATOLOGY 4TH FLOOR  [address] [facility] [address]  Dept: [phone]  Dept Fax:  [phone]    Over-the-counter Retinol Products    Neutrogena Rapid Wrinkle Repair with retinol     - OR -    Oil of Olay Regenerist Intensive Repair with retinol    Apply a small amount to the entire face at bedtime    Use a facial sunscreen with SPF of at least 30 daily

## 2023-09-25 ENCOUNTER — TELEPHONE (OUTPATIENT)
Dept: INTERNAL MEDICINE | Facility: CLINIC | Age: 68
End: 2023-09-25
Payer: MEDICARE

## 2023-09-25 NOTE — TELEPHONE ENCOUNTER
Tried calling the patient no answer a message was left for the patient that Dr Marie will not be in clinic on 9/26/23. Appointment will be canceled for 9/26/23. The patient informed to call the clinic and reschedule his appointment.

## 2023-10-02 ENCOUNTER — OFFICE VISIT (OUTPATIENT)
Dept: INTERNAL MEDICINE | Facility: CLINIC | Age: 68
End: 2023-10-02
Payer: MEDICARE

## 2023-10-02 VITALS
OXYGEN SATURATION: 95 % | TEMPERATURE: 97 F | SYSTOLIC BLOOD PRESSURE: 102 MMHG | HEART RATE: 60 BPM | WEIGHT: 138.69 LBS | DIASTOLIC BLOOD PRESSURE: 76 MMHG | HEIGHT: 65 IN | BODY MASS INDEX: 23.11 KG/M2 | RESPIRATION RATE: 17 BRPM

## 2023-10-02 DIAGNOSIS — Z23 NEED FOR TD VACCINE: ICD-10-CM

## 2023-10-02 DIAGNOSIS — H81.10 BENIGN PAROXYSMAL POSITIONAL VERTIGO, UNSPECIFIED LATERALITY: Primary | ICD-10-CM

## 2023-10-02 DIAGNOSIS — R49.0 HOARSE VOICE QUALITY: Chronic | ICD-10-CM

## 2023-10-02 DIAGNOSIS — Z23 NEED FOR INFLUENZA VACCINATION: ICD-10-CM

## 2023-10-02 DIAGNOSIS — K21.9 LARYNGOPHARYNGEAL REFLUX: Chronic | ICD-10-CM

## 2023-10-02 DIAGNOSIS — E78.2 MIXED HYPERLIPIDEMIA: Chronic | ICD-10-CM

## 2023-10-02 PROBLEM — R09.89 PROMINENT ABDOMINAL AORTIC PULSATION: Chronic | Status: ACTIVE | Noted: 2023-06-13

## 2023-10-02 PROBLEM — Z28.21 IMMUNIZATION NOT CARRIED OUT BECAUSE OF PATIENT REFUSAL: Status: RESOLVED | Noted: 2022-02-22 | Resolved: 2023-10-02

## 2023-10-02 PROBLEM — E78.5 DYSLIPIDEMIA: Chronic | Status: ACTIVE | Noted: 2022-02-22

## 2023-10-02 PROCEDURE — 1101F PR PT FALLS ASSESS DOC 0-1 FALLS W/OUT INJ PAST YR: ICD-10-PCS | Mod: CPTII,S$GLB,, | Performed by: FAMILY MEDICINE

## 2023-10-02 PROCEDURE — 3078F PR MOST RECENT DIASTOLIC BLOOD PRESSURE < 80 MM HG: ICD-10-PCS | Mod: CPTII,S$GLB,, | Performed by: FAMILY MEDICINE

## 2023-10-02 PROCEDURE — 3008F BODY MASS INDEX DOCD: CPT | Mod: CPTII,S$GLB,, | Performed by: FAMILY MEDICINE

## 2023-10-02 PROCEDURE — 3074F PR MOST RECENT SYSTOLIC BLOOD PRESSURE < 130 MM HG: ICD-10-PCS | Mod: CPTII,S$GLB,, | Performed by: FAMILY MEDICINE

## 2023-10-02 PROCEDURE — 3008F PR BODY MASS INDEX (BMI) DOCUMENTED: ICD-10-PCS | Mod: CPTII,S$GLB,, | Performed by: FAMILY MEDICINE

## 2023-10-02 PROCEDURE — 90694 FLU VACCINE - QUADRIVALENT - ADJUVANTED: ICD-10-PCS | Mod: S$GLB,,, | Performed by: FAMILY MEDICINE

## 2023-10-02 PROCEDURE — G0008 FLU VACCINE - QUADRIVALENT - ADJUVANTED: ICD-10-PCS | Mod: S$GLB,,, | Performed by: FAMILY MEDICINE

## 2023-10-02 PROCEDURE — 3074F SYST BP LT 130 MM HG: CPT | Mod: CPTII,S$GLB,, | Performed by: FAMILY MEDICINE

## 2023-10-02 PROCEDURE — 99999 PR PBB SHADOW E&M-EST. PATIENT-LVL V: ICD-10-PCS | Mod: PBBFAC,,, | Performed by: FAMILY MEDICINE

## 2023-10-02 PROCEDURE — 90694 VACC AIIV4 NO PRSRV 0.5ML IM: CPT | Mod: S$GLB,,, | Performed by: FAMILY MEDICINE

## 2023-10-02 PROCEDURE — 99214 OFFICE O/P EST MOD 30 MIN: CPT | Mod: S$GLB,,, | Performed by: FAMILY MEDICINE

## 2023-10-02 PROCEDURE — 3288F FALL RISK ASSESSMENT DOCD: CPT | Mod: CPTII,S$GLB,, | Performed by: FAMILY MEDICINE

## 2023-10-02 PROCEDURE — 99999 PR PBB SHADOW E&M-EST. PATIENT-LVL V: CPT | Mod: PBBFAC,,, | Performed by: FAMILY MEDICINE

## 2023-10-02 PROCEDURE — G0008 ADMIN INFLUENZA VIRUS VAC: HCPCS | Mod: S$GLB,,, | Performed by: FAMILY MEDICINE

## 2023-10-02 PROCEDURE — 1101F PT FALLS ASSESS-DOCD LE1/YR: CPT | Mod: CPTII,S$GLB,, | Performed by: FAMILY MEDICINE

## 2023-10-02 PROCEDURE — 3288F PR FALLS RISK ASSESSMENT DOCUMENTED: ICD-10-PCS | Mod: CPTII,S$GLB,, | Performed by: FAMILY MEDICINE

## 2023-10-02 PROCEDURE — 1126F AMNT PAIN NOTED NONE PRSNT: CPT | Mod: CPTII,S$GLB,, | Performed by: FAMILY MEDICINE

## 2023-10-02 PROCEDURE — 99214 PR OFFICE/OUTPT VISIT, EST, LEVL IV, 30-39 MIN: ICD-10-PCS | Mod: S$GLB,,, | Performed by: FAMILY MEDICINE

## 2023-10-02 PROCEDURE — 3078F DIAST BP <80 MM HG: CPT | Mod: CPTII,S$GLB,, | Performed by: FAMILY MEDICINE

## 2023-10-02 PROCEDURE — 1159F MED LIST DOCD IN RCRD: CPT | Mod: CPTII,S$GLB,, | Performed by: FAMILY MEDICINE

## 2023-10-02 PROCEDURE — 1126F PR PAIN SEVERITY QUANTIFIED, NO PAIN PRESENT: ICD-10-PCS | Mod: CPTII,S$GLB,, | Performed by: FAMILY MEDICINE

## 2023-10-02 PROCEDURE — 1159F PR MEDICATION LIST DOCUMENTED IN MEDICAL RECORD: ICD-10-PCS | Mod: CPTII,S$GLB,, | Performed by: FAMILY MEDICINE

## 2023-10-02 RX ORDER — PANTOPRAZOLE SODIUM 40 MG/1
40 TABLET, DELAYED RELEASE ORAL EVERY MORNING
Qty: 90 TABLET | Refills: 3 | Status: SHIPPED | OUTPATIENT
Start: 2023-10-02 | End: 2024-10-01

## 2023-10-02 RX ORDER — PANTOPRAZOLE SODIUM 40 MG/1
40 TABLET, DELAYED RELEASE ORAL DAILY
Qty: 90 TABLET | Refills: 3 | Status: SHIPPED | OUTPATIENT
Start: 2023-10-02 | End: 2023-10-02 | Stop reason: SDUPTHER

## 2023-10-02 NOTE — ASSESSMENT & PLAN NOTE
Lab Results   Component Value Date    CHOL 186 06/13/2023    CHOL 212 (H) 02/22/2022    TRIG 86 06/13/2023    TRIG 109 02/22/2022    HDL 56 06/13/2023    HDL 51 02/22/2022    LDLCALC 112.8 06/13/2023    LDLCALC 139.2 02/22/2022    NONHDLCHOL 130 06/13/2023    NONHDLCHOL 161 02/22/2022    AST 24 06/13/2023    ALT 20 06/13/2023     The 10-year ASCVD risk score (Lizbeth RODRIGUEZ, et al., 2019) is: 10.6%    Values used to calculate the score:      Age: 67 years      Sex: Male      Is Non- : No      Diabetic: No      Tobacco smoker: No      Systolic Blood Pressure: 102 mmHg      Is BP treated: No      HDL Cholesterol: 56 mg/dL      Total Cholesterol: 186 mg/dL

## 2023-10-02 NOTE — PROGRESS NOTES
OFFICE VISIT 10/2/23  8:20 AM CDT    CHIEF COMPLAINT: Dizziness    1. Benign paroxysmal positional vertigo, unspecified laterality  Over the last few weeks he has had episodes typical of benign positional vertigo, described as sensation of room spinning, reproducibly exacerbated by change in head position, particularly when lying down in bed.  Episode spontaneously resolved within a minute.  He describes no orthostatic dizziness or lightheadedness.  Patient education provided.  -     Ambulatory referral/consult to Audiology; Future; Expected date: 10/09/2023    2. Hoarse voice quality  3. Laryngopharyngeal reflux    He took pantoprazole for less than one month and then discontinued it due to perceived lack of efficacy.  I recommended that he take it for a minimum of three months and then re-evaluate.  He is to let me know if this fails to provide satisfactory improvement.  -     pantoprazole (PROTONIX) 40 MG tablet; Take 1 tablet (40 mg total) by mouth every morning.  Dispense: 90 tablet; Refill: 3    4. Mixed hyperlipidemia    Significantly improved through therapeutic lifestyle changes.  He declines statin therapy.  Assessment & Plan:  Lab Results   Component Value Date    CHOL 186 06/13/2023    CHOL 212 (H) 02/22/2022    TRIG 86 06/13/2023    TRIG 109 02/22/2022    HDL 56 06/13/2023    HDL 51 02/22/2022    LDLCALC 112.8 06/13/2023    LDLCALC 139.2 02/22/2022    NONHDLCHOL 130 06/13/2023    NONHDLCHOL 161 02/22/2022    AST 24 06/13/2023    ALT 20 06/13/2023     The 10-year ASCVD risk score (Lizbeth RODRIGUEZ, et al., 2019) is: 10.6%    Values used to calculate the score:      Age: 67 years      Sex: Male      Is Non- : No      Diabetic: No      Tobacco smoker: No      Systolic Blood Pressure: 102 mmHg      Is BP treated: No      HDL Cholesterol: 56 mg/dL      Total Cholesterol: 186 mg/dL       5. Need for Td vaccine  -     tetanus and diphther. tox, PF, (TENIVAC, PF,) 5-2 Lf unit/0.5 mL Syrg;  "Inject 0.5 mLs into the muscle once. for 1 dose  Dispense: 0.5 mL; Refill: 0    6. Need for influenza vaccination  -     Influenza - Quadrivalent (Adjuvanted)Unless noted herein, any chronic conditions are represented as and appear stable, and no other significant complaints or concerns were reported.    No follow-ups on file.   Future Appointments   Date Time Provider Department Center   6/13/2024  9:20 AM ASHLEY Marie MD [facility] [place]       Review of Systems   HENT:  Negative for rhinorrhea and trouble swallowing.    Respiratory:  Negative for chest tightness and wheezing.        Vitals:    10/02/23 0822   BP: 102/76   BP Location: Left arm   Patient Position: Sitting   BP Method: Large (Manual)   Pulse: 60   Resp: 17   Temp: 97 °F (36.1 °C)   SpO2: 95%   Weight: 62.9 kg (138 lb 10.7 oz)   Height: 5' 5" (1.651 m)   Physical Exam  Vitals reviewed.   Constitutional:       General: He is not in acute distress.     Appearance: Normal appearance. He is not ill-appearing or diaphoretic.   HENT:      Right Ear: Tympanic membrane, ear canal and external ear normal. There is no impacted cerumen.      Left Ear: Tympanic membrane, ear canal and external ear normal. There is no impacted cerumen.   Cardiovascular:      Rate and Rhythm: Regular rhythm.   Pulmonary:      Effort: Pulmonary effort is normal.      Breath sounds: Normal breath sounds.   Skin:     General: Skin is warm and dry.   Neurological:      General: No focal deficit present.      Mental Status: He is alert and oriented to person, place, and time. Mental status is at baseline.      Cranial Nerves: No cranial nerve deficit.      Motor: No weakness.      Coordination: Coordination normal.      Gait: Gait normal.   Psychiatric:         Mood and Affect: Mood normal.         Behavior: Behavior normal.         Judgment: Judgment normal.       Documentation entered by me for this encounter may have been done in part using speech-recognition technology. " "Although I have made an effort to ensure accuracy, "sound like" errors may exist and should be interpreted in context.  "

## 2023-10-04 ENCOUNTER — TELEPHONE (OUTPATIENT)
Dept: INTERNAL MEDICINE | Facility: CLINIC | Age: 68
End: 2023-10-04
Payer: MEDICARE

## 2023-10-04 NOTE — TELEPHONE ENCOUNTER
----- Message from Gee Rust sent at 10/4/2023  1:23 PM CDT -----  Contact: Chuy  Type:  Patient Returning Call    Who Called:Chuy   Who Left Message for Patient:Zina   Does the patient know what this is regarding?:scheduling an appointment   Would the patient rather a call back or a response via [facility]? Call back   Best Call Back Number:[phone]  Additional Information:   Thanks   Am

## 2023-10-25 ENCOUNTER — OFFICE VISIT (OUTPATIENT)
Dept: OTOLARYNGOLOGY | Facility: CLINIC | Age: 68
End: 2023-10-25
Payer: MEDICARE

## 2023-10-25 DIAGNOSIS — H81.11 BPPV (BENIGN PAROXYSMAL POSITIONAL VERTIGO), RIGHT: Primary | ICD-10-CM

## 2023-10-25 PROCEDURE — 1159F MED LIST DOCD IN RCRD: CPT | Mod: CPTII,S$GLB,, | Performed by: PHYSICIAN ASSISTANT

## 2023-10-25 PROCEDURE — 1159F PR MEDICATION LIST DOCUMENTED IN MEDICAL RECORD: ICD-10-PCS | Mod: CPTII,S$GLB,, | Performed by: PHYSICIAN ASSISTANT

## 2023-10-25 PROCEDURE — 99214 OFFICE O/P EST MOD 30 MIN: CPT | Mod: S$GLB,,, | Performed by: PHYSICIAN ASSISTANT

## 2023-10-25 PROCEDURE — 1101F PT FALLS ASSESS-DOCD LE1/YR: CPT | Mod: CPTII,S$GLB,, | Performed by: PHYSICIAN ASSISTANT

## 2023-10-25 PROCEDURE — 3288F FALL RISK ASSESSMENT DOCD: CPT | Mod: CPTII,S$GLB,, | Performed by: PHYSICIAN ASSISTANT

## 2023-10-25 PROCEDURE — 3288F PR FALLS RISK ASSESSMENT DOCUMENTED: ICD-10-PCS | Mod: CPTII,S$GLB,, | Performed by: PHYSICIAN ASSISTANT

## 2023-10-25 PROCEDURE — 99214 PR OFFICE/OUTPT VISIT, EST, LEVL IV, 30-39 MIN: ICD-10-PCS | Mod: S$GLB,,, | Performed by: PHYSICIAN ASSISTANT

## 2023-10-25 PROCEDURE — 1101F PR PT FALLS ASSESS DOC 0-1 FALLS W/OUT INJ PAST YR: ICD-10-PCS | Mod: CPTII,S$GLB,, | Performed by: PHYSICIAN ASSISTANT

## 2023-10-25 PROCEDURE — 99999 PR PBB SHADOW E&M-EST. PATIENT-LVL II: ICD-10-PCS | Mod: PBBFAC,,, | Performed by: PHYSICIAN ASSISTANT

## 2023-10-25 PROCEDURE — 99999 PR PBB SHADOW E&M-EST. PATIENT-LVL II: CPT | Mod: PBBFAC,,, | Performed by: PHYSICIAN ASSISTANT

## 2023-10-25 NOTE — PROGRESS NOTES
"Subjective     Patient ID: Chuy Bucio is a 67 y.o. male.    Chief Complaint: Dizziness (occurs with positional changes. Does not believe it is "crystals" like PCP stated. )    Patient is a very pleasant 67 y.o. male here to see me today for the first time for evaluation of dizziness.   He reports that the symptoms have been present for the last 7 weeks; occurs with sudden changes in position such as bending over or rolling over to his side while in bed.  On average, the patient reports symptoms that occur several times a week but not daily.  He describes the dizziness as spinning and says that it lasts seconds.  He denies any recent head trauma; reports it started after repetitive bending over while raking/bagging leaves in his yard.   He denies aural pressure, otalgia, otorrhea, tinnitus, and hearing loss.  He has not started any new medications, and has not had any recent dietary changes.  He states that Dr. Marie mentioned BPPV when he saw him on 10/2/23, but he thinks he's wrong as he's been doing Epley manuevers bilaterally with no relief.  He thinks a sinus infection triggered the dizziness and requests a Zpack.  No nasal drainage or sinus pain; no fever or headaches.  He is not using any OTC medications for sinuses and not using any nasal sprays.          Review of Systems   Constitutional: Negative.  Negative for fever.   HENT:  Negative for nasal congestion, ear discharge, ear pain, postnasal drip, rhinorrhea and sinus pressure/congestion.    Eyes: Negative.    Respiratory: Negative.     Cardiovascular: Negative.    Gastrointestinal: Negative.    Endocrine: Negative.    Genitourinary: Negative.    Musculoskeletal: Negative.    Integumentary:  Negative.   Neurological:  Positive for dizziness. Negative for headaches.   Hematological: Negative.    Psychiatric/Behavioral: Negative.            Objective     Physical Exam  Constitutional:       Appearance: Normal appearance.   HENT:      Head: " Normocephalic and atraumatic.      Right Ear: Tympanic membrane, ear canal and external ear normal. No middle ear effusion. Tympanic membrane is not injected or erythematous.      Left Ear: Tympanic membrane, ear canal and external ear normal.  No middle ear effusion. Tympanic membrane is not injected or erythematous.      Nose: Nose normal. No mucosal edema, congestion or rhinorrhea.      Right Sinus: No maxillary sinus tenderness or frontal sinus tenderness.      Left Sinus: No maxillary sinus tenderness or frontal sinus tenderness.      Mouth/Throat:      Mouth: Mucous membranes are moist.      Pharynx: Oropharynx is clear.   Pulmonary:      Effort: Pulmonary effort is normal.   Neurological:      General: No focal deficit present.      Mental Status: He is alert.           David-Hallpike:  POSITIVE on the right (delayed response) for brief torsional nystagmus and pt was symptomatic            Chuy Bucio was seen 10/25/2023 for Epley maneuver for BPPV in the right ear.      A 5-position Epley maneuver was performed for the right.  Patient tolerated the maneuver well and was asymptomatic upon discharge.  No nystagmus seen on post-procedure examination.  Post-Epley home instructions were reviewed and given to the patient.  Understanding was voiced.         Assessment and Plan     1. BPPV (benign paroxysmal positional vertigo), right      Discussed with patient that he is POSITIVE for BPPV on the right with torsional nystagmus.  He was symptomatic as well.  We had a long discussion regarding the relevant anatomy and pathology relevant to BPPV.  We discussed that in the ear there are three semicircular canals that detect rotational movement.  BPPV occurs as a result of otoconia, tiny crystals of calcium carbonate that are a normal part of the inner ears anatomy, detaching from their normal anatomic position and collecting in one of the semicircular canals.  When the head moves, the otoconia shift. This  stimulates the cupula to send false signals to the brain, producing vertigo and triggering nystagmus (involuntary eye movements).      An Epley maneuver was performed for the right today.  Patient tolerated the maneuver well and was asymptomatic upon discharge.  No nystagmus seen on post-procedure examination.  Post-Epley home instructions were reviewed and given to the patient.  Understanding was voiced.       I do not recommend oral antibiotics at this time.  He has no purulent nasal drainage, sinus pain or fever.  Plan to recheck BPPV in 1 week.         No follow-ups on file.

## 2023-11-01 ENCOUNTER — CLINICAL SUPPORT (OUTPATIENT)
Dept: AUDIOLOGY | Facility: CLINIC | Age: 68
End: 2023-11-01
Payer: MEDICARE

## 2023-11-01 DIAGNOSIS — R42 DIZZINESS: Primary | ICD-10-CM

## 2023-11-01 NOTE — PROGRESS NOTES
Chuy Bucio was seen 11/01/2023 for BPPV testing. Patient reported improvement in dizziness since previous appointment.     Right David-Hallpike: Negative for BPPV  Left David-Hallpike: Negative for BPPV    Patient was counseled on the above findings.     Recommendations:  ENT Review.

## 2024-06-05 ENCOUNTER — TELEPHONE (OUTPATIENT)
Dept: AUDIOLOGY | Facility: CLINIC | Age: 69
End: 2024-06-05
Payer: MEDICARE

## 2024-06-05 NOTE — TELEPHONE ENCOUNTER
----- Message from Xenia Sosa sent at 6/5/2024 10:57 AM CDT -----  Contact: Chuy Vera is needing a call back regarding instructions on vertigo that you gave him. He lost them and want to see if you can resend them through his email  [email or web address].    Thank you summer

## 2024-06-13 ENCOUNTER — OFFICE VISIT (OUTPATIENT)
Dept: INTERNAL MEDICINE | Facility: CLINIC | Age: 69
End: 2024-06-13
Payer: MEDICARE

## 2024-06-13 VITALS
HEIGHT: 65 IN | OXYGEN SATURATION: 97 % | TEMPERATURE: 96 F | SYSTOLIC BLOOD PRESSURE: 112 MMHG | DIASTOLIC BLOOD PRESSURE: 80 MMHG | BODY MASS INDEX: 23.14 KG/M2 | RESPIRATION RATE: 18 BRPM | WEIGHT: 138.88 LBS | HEART RATE: 93 BPM

## 2024-06-13 DIAGNOSIS — E78.2 MODERATE MIXED HYPERLIPIDEMIA NOT REQUIRING STATIN THERAPY: Chronic | ICD-10-CM

## 2024-06-13 DIAGNOSIS — H81.10 BENIGN PAROXYSMAL POSITIONAL VERTIGO, UNSPECIFIED LATERALITY: Primary | Chronic | ICD-10-CM

## 2024-06-13 DIAGNOSIS — A60.00 HERPES SIMPLEX INFECTION OF GENITOURINARY SYSTEM: Chronic | ICD-10-CM

## 2024-06-13 DIAGNOSIS — Z12.5 SCREENING PSA (PROSTATE SPECIFIC ANTIGEN): ICD-10-CM

## 2024-06-13 PROBLEM — J41.0 SIMPLE CHRONIC BRONCHITIS: Chronic | Status: RESOLVED | Noted: 2019-06-11 | Resolved: 2024-06-13

## 2024-06-13 LAB
ALBUMIN SERPL BCP-MCNC: 4 G/DL (ref 3.5–5.2)
ALP SERPL-CCNC: 64 U/L (ref 55–135)
ALT SERPL W/O P-5'-P-CCNC: 19 U/L (ref 10–44)
ANION GAP SERPL CALC-SCNC: 6 MMOL/L (ref 8–16)
AST SERPL-CCNC: 22 U/L (ref 10–40)
BILIRUB SERPL-MCNC: 1.6 MG/DL (ref 0.1–1)
BUN SERPL-MCNC: 14 MG/DL (ref 8–23)
CALCIUM SERPL-MCNC: 9.3 MG/DL (ref 8.7–10.5)
CHLORIDE SERPL-SCNC: 106 MMOL/L (ref 95–110)
CHOLEST SERPL-MCNC: 192 MG/DL (ref 120–199)
CHOLEST/HDLC SERPL: 3.9 {RATIO} (ref 2–5)
CO2 SERPL-SCNC: 27 MMOL/L (ref 23–29)
COMPLEXED PSA SERPL-MCNC: 0.24 NG/ML (ref 0–4)
CREAT SERPL-MCNC: 0.8 MG/DL (ref 0.5–1.4)
EST. GFR  (NO RACE VARIABLE): >60 ML/MIN/1.73 M^2
GLUCOSE SERPL-MCNC: 88 MG/DL (ref 70–110)
HDLC SERPL-MCNC: 49 MG/DL (ref 40–75)
HDLC SERPL: 25.5 % (ref 20–50)
LDLC SERPL CALC-MCNC: 123.8 MG/DL (ref 63–159)
NONHDLC SERPL-MCNC: 143 MG/DL
POTASSIUM SERPL-SCNC: 3.8 MMOL/L (ref 3.5–5.1)
PROT SERPL-MCNC: 6.7 G/DL (ref 6–8.4)
SODIUM SERPL-SCNC: 139 MMOL/L (ref 136–145)
TRIGL SERPL-MCNC: 96 MG/DL (ref 30–150)

## 2024-06-13 PROCEDURE — 80053 COMPREHEN METABOLIC PANEL: CPT | Performed by: FAMILY MEDICINE

## 2024-06-13 PROCEDURE — 3288F FALL RISK ASSESSMENT DOCD: CPT | Mod: CPTII,S$GLB,, | Performed by: FAMILY MEDICINE

## 2024-06-13 PROCEDURE — G2211 COMPLEX E/M VISIT ADD ON: HCPCS | Mod: S$GLB,,, | Performed by: FAMILY MEDICINE

## 2024-06-13 PROCEDURE — 99214 OFFICE O/P EST MOD 30 MIN: CPT | Mod: S$GLB,,, | Performed by: FAMILY MEDICINE

## 2024-06-13 PROCEDURE — 80061 LIPID PANEL: CPT | Performed by: FAMILY MEDICINE

## 2024-06-13 PROCEDURE — 3074F SYST BP LT 130 MM HG: CPT | Mod: CPTII,S$GLB,, | Performed by: FAMILY MEDICINE

## 2024-06-13 PROCEDURE — 99999 PR PBB SHADOW E&M-EST. PATIENT-LVL IV: CPT | Mod: PBBFAC,,, | Performed by: FAMILY MEDICINE

## 2024-06-13 PROCEDURE — 1126F AMNT PAIN NOTED NONE PRSNT: CPT | Mod: CPTII,S$GLB,, | Performed by: FAMILY MEDICINE

## 2024-06-13 PROCEDURE — 1159F MED LIST DOCD IN RCRD: CPT | Mod: CPTII,S$GLB,, | Performed by: FAMILY MEDICINE

## 2024-06-13 PROCEDURE — 1160F RVW MEDS BY RX/DR IN RCRD: CPT | Mod: CPTII,S$GLB,, | Performed by: FAMILY MEDICINE

## 2024-06-13 PROCEDURE — 1101F PT FALLS ASSESS-DOCD LE1/YR: CPT | Mod: CPTII,S$GLB,, | Performed by: FAMILY MEDICINE

## 2024-06-13 PROCEDURE — 3079F DIAST BP 80-89 MM HG: CPT | Mod: CPTII,S$GLB,, | Performed by: FAMILY MEDICINE

## 2024-06-13 PROCEDURE — 84153 ASSAY OF PSA TOTAL: CPT | Performed by: FAMILY MEDICINE

## 2024-06-13 PROCEDURE — 3008F BODY MASS INDEX DOCD: CPT | Mod: CPTII,S$GLB,, | Performed by: FAMILY MEDICINE

## 2024-06-13 RX ORDER — ACYCLOVIR 400 MG/1
400 TABLET ORAL DAILY
Qty: 90 TABLET | Refills: 4 | Status: SHIPPED | OUTPATIENT
Start: 2024-06-13

## 2024-06-13 NOTE — PATIENT INSTRUCTIONS
RECOMMENDED IMMUNIZATIONS  Tetanus vaccination: The tetanus vaccine protects against tetanus, a serious bacterial infection that affects the nervous system and can cause severe muscle spasms and even death. The vaccine is recommended for adults every 10 years and may be recommended after an injury, such as a deep cut or puncture wound.  COVID-19 vaccination: The COVID-19 vaccine protects against COVID-19 (coronavirus disease 2019), a disease caused by a virus named SARS-CoV-2 discovered in December 2019. It is very contagious and has quickly spread around the world. COVID-19 most often causes respiratory symptoms that can feel much like a cold, the flu, or pneumonia. However, COVID-19 can attack more than the lungs and respiratory system. The disease may also affect the heart, brain, and other parts of the body. Most people with COVID-19 have mild symptoms, but some people become severely ill and can have long-lasting symptoms, including shortness of breath, fatigue, and memory problems. People fully vaccinated against COVID-19 have much lower rates of severe illness, hospitalization, and death from COVID-19 compared to those not fully vaccinated.      Respiratory Syncytial Virus (RSV) Vaccination for Adults 60 Years and Older    As your family physician, I want to provide you with some important information about a new vaccine for Respiratory Syncytial Virus (RSV), a virus that can cause severe respiratory illness in older adults.     What is RSV and why is it important?  RSV causes seasonal epidemics of respiratory illness and can lead to substantial illness and even death, especially in older adults. It is estimated that RSV leads to 60,000-160,000 hospitalizations and 6,000-10,000 deaths annually among adults aged 65 years and older.     What is the RSV vaccine?  In May 2023, the U.[place]. Food and Drug Administration (FDA) approved the first two vaccines for preventing RSV-associated lower respiratory tract disease  in adults aged 60 years and older. The two vaccines are RSVPreF3 (Arexvy) and RSVpreF (Abrysvo). Both vaccines have shown moderate to high efficacy in preventing RSV-associated illness.    Who should get the RSV vaccine?  I recommend the RSV vaccine for my patients aged 60+ who have chronic underlying medical conditions associated with increased risk for complications from RSV infection. Such conditions include lung disease (like chronic obstructive pulmonary disease and asthma), cardiovascular diseases (like congestive heart failure and coronary artery disease), moderate or severe immune compromise, diabetes mellitus, neurologic or neuromuscular conditions, kidney disorders, liver disorders, and hematologic disorders.   If you are 60+ but do not have such high-risk chronic underlying medical conditions, you may choose to get the RSV vaccine. However, I currently recommend that those at low risk for complications from RSV infection delay their immunization until next year, when more data about the risks, benefits, and efficacy of the RSV vaccine will be available.    What are the side effects of the RSV vaccine?  Possible side effects include pain, redness, and swelling at the injection site, fatigue, fever, headache, nausea, diarrhea, and muscle or joint pain. These side effects are usually mild. A small number of participants in clinical trials developed serious neurologic conditions, like Guillain-Barré syndrome, after RSV vaccination, but it is unclear whether these were caused by the vaccine or occurred by chance.    When should you get the RSV vaccine?  For the 2023-24 RSV season, if you are 60 years or older and at high risk for severe RSV disease, you should get an RSV vaccine as soon as it is available in your community. This is usually in the fall and winter, before the number of RSV cases start to increase.    Where can I learn more?  Learn about RSV at https://www.cdc.gov/rsv/index.html  Learn about the  RSV vaccine at https://www.cdc.gov/vaccines/vpd/rsv/index.html    I'll be happy to talk with you to address any additional questions or concerns you may have about RSV or the RSV vaccine. If you have any questions or concerns, schedule a virtual visit with me at your convenience.

## 2024-06-17 DIAGNOSIS — R17 ELEVATED BILIRUBIN: Primary | ICD-10-CM

## 2024-06-17 NOTE — PROGRESS NOTES
"Dear Chuy,    I hope you're feeling well today. I've had a chance to review your recent lab work, and I'd like to share some thoughts with you.    Your comprehensive metabolic panel is looking good overall, which is always encouraging. I did notice, however, that your total bilirubin has been on a gradual rise over the last three years, from 1.1 to now 1.6. Bilirubin is a substance the liver produces, and while the increase is modest, it's important for us to keep an eye on this trend to ensure your liver remains healthy. To stay ahead of any potential issues, I suggest we recheck your total and direct bilirubin levels in 3 months just to be safe.    Here's how to schedule lab appointment using [facility]:    -From the [facility] Menu, choose "Schedule an Appointment."    -Under "Tell us why you are coming in" choose "Lab Services."    -Then choose the option, "My provider has placed electronic orders, and I will provide the name of my ordering physician or provider at check-in."  When you check-in for your lab appointment, tell them that you are there for the labs I ordered 06/17/2024. (Better yet, show them this message with your labs listed below.) If you have any difficulty, call our appointment desk at [phone], and they'll be glad to help.     On another note, your cholesterol levels have gotten a bit higher. Since I recall you prefer not to use statin medications, I recommend a heart-healthy Mediterranean diet. This diet is rich in fruits, vegetables, whole grains, and healthy fats like olive oil, and it's been shown to be quite effective in managing cholesterol levels.    Incorporating this diet into your routine can be a delicious and enjoyable way to positively impact your cholesterol without medication. Let's plan to touch base on how you're finding the diet at your next appointment.    I'm here to support you on your journey to optimal health.    Warm regards,    Dr. RODRIGUEZ    Orders Placed This " Encounter   Procedures    Bilirubin, Total    Bilirubin, Direct

## 2024-06-17 NOTE — PROGRESS NOTES
"Dear Chuy,    I hope you're feeling well today. I've had a chance to review your recent lab work, and I'd like to share some thoughts with you.    Your comprehensive metabolic panel is looking good overall, which is always encouraging. I did notice, however, that your total bilirubin has been on a gradual rise over the last three years, from 1.1 to now 1.6. Bilirubin is a substance the liver produces, and while the increase is modest, it's important for us to keep an eye on this trend to ensure your liver remains healthy. To stay ahead of any potential issues, I suggest we recheck your total and direct bilirubin levels in 3 months just to be safe.    Here's how to schedule lab appointment using [facility]:    -From the [facility] Menu, choose "Schedule an Appointment."    -Under "Tell us why you are coming in" choose "Lab Services."    -Then choose the option, "My provider has placed electronic orders, and I will provide the name of my ordering physician or provider at check-in."  When you check-in for your lab appointment, tell them that you are there for the labs I ordered 06/17/2024. (Better yet, show them this message with your labs listed below.) If you have any difficulty, call our appointment desk at [phone], and they'll be glad to help.     On another note, your cholesterol levels have gotten a bit higher. Since I recall you prefer not to use statin medications, I recommend a heart-healthy Mediterranean diet. This diet is rich in fruits, vegetables, whole grains, and healthy fats like olive oil, and it's been shown to be quite effective in managing cholesterol levels.    Incorporating this diet into your routine can be a delicious and enjoyable way to positively impact your cholesterol without medication. Let's plan to touch base on how you're finding the diet at your next appointment.    I'm here to support you on your journey to optimal health.    Warm regards,    Dr. MICHAEL Cee Orders Placed This " Encounter  ·Bilirubin, Total  ·Bilirubin, Direct

## 2024-06-26 NOTE — PROGRESS NOTES
OFFICE VISIT 6/13/24  9:20 AM CDT    History of Present Illness    Chuy presents today for a routine checkup.    He has been using clobetasol for scalp irritation but one spot is not resolving despite treatment. He wakes up every morning wanting to scratch his head and has found spots of blood on his pillow before starting to wear a cap at night to avoid disturbing the scalp. He thinks the scalp issue is spreading and wants to see a dermatologist again for a stronger treatment than clobetasol.    He reports brief episodes of vertigo lasting 5-10 seconds that occur when getting up in the morning or leaning down. They started after blowing leaves in the backyard without a mask. He has been performing Epley maneuver exercises from the internet but desires the instruction sheets previously provided by the audiologist. He denies any other associated symptoms.    He has a history of chronic cough that would occur in the winter months, especially after playing music late at night in cold air. However, the cough is not as severe now and he denies it recently. He denies chronic cough lasting more than 60 days per year.    He reports sinus drip that he attributes to lingering effects of COVID-19 infection. He took prescribed medication for 9 months without any improvement or difference. He denies any other related symptoms.    His wife is concerned because his feet are always cold, even when lying in bed. He feels like portions of his feet are dead at times, but can still feel sensations. He thinks his cold feet may be related to the gel inserts in his shoes.    He takes acyclovir daily to suppress herpes simplex virus (HSV) outbreaks, considering it a daily vitamin. His last outbreak was approximately 25 years ago. He denies any current HSV symptoms or outbreaks.       Review of Systems: Except as noted herein, ROS is otherwise negative.     Assessment & Plan    J41.0 SIMPLE CHRONIC BRONCHITIS:   Determined that the  patient's chronic cough has resolved and does not meet the criteria for chronic bronchitis.   Assessed the cough as likely cold-induced and self-limiting.   The patient's cough typically appears when it starts getting colder and goes away by itself, lasting less than 60 days per year.   Examined the patient's lungs and found them to be clear.  H81.10 BENIGN PAROXYSMAL VERTIGO, UNSPECIFIED EAR:   Assessed the patient's BPV as likely triggered by leaf blowing without a mask, resulting in exposure to debris.   The patient's vertigo symptoms are brief, lasting 5-10 seconds, and occur upon bending down in the morning.   Will provide instructions for the Epley maneuver.   Advised the patient to  a copy of the Epley maneuver instructions from the audiology/ENT department today if available, without an appointment.   The patient previously lost the instructions from the audiologist on the Epley maneuver and downloaded instructions from the internet, but found them not as effective.  Z23 ENCOUNTER FOR IMMUNIZATION:   Educated the patient on vaccinations they are due for, including tetanus booster, flu vaccine, COVID booster, and the new CDC recommendation for RSV vaccination for adults over 60, especially those with chronic lung disease or susceptibility to bronchitis.   Provided printed information summarizing the immunizations the patient is due for on the after-visit summary.   Recommend that the patient get the tetanus booster at the pharmacy and stay current on the flu vaccine and COVID booster.  L30.9 DERMATITIS, UNSPECIFIED:   The patient's scalp psoriasis has worsened despite using clobetasol, with itching and bleeding.   Noted spreading of the psoriasis.   The patient wakes up every morning wanting to scratch their head off and sleeps with a cap on to not disturb it by rolling on the pillow.   One spot is not going away with clobetasol, and small spots of blood were found on the pillow before wearing the  felix.   Referred the patient to dermatologist Dr. Brigette Mcdowell for evaluation and consideration of stronger treatment options.   The patient is an established patient of Dr. Mcdowell, having seen her in September of last year, so no referral is needed and the patient can schedule directly.  B00.9 HERPESVIRAL INFECTION, UNSPECIFIED:   Continued acyclovir 1 tablet daily for HSV suppression, as previous discontinuation attempts led to outbreaks and the patient prefers to stay on it to prevent outbreaks.   Assessed continuing acyclovir as a reasonable preventive measure.   The patient takes acyclovir daily to suppress HSV like a vitamin, with the last outbreak occurring around 25 years ago.   Discussed with the patient about potentially getting off acyclovir, but patient reports feeling HSV coming back when they got off it previously.       1. Benign paroxysmal positional vertigo, unspecified laterality    2. Screening PSA (prostate specific antigen)  -     PSA, SCREENING; Future; Expected date: 06/13/2024    3. Moderate mixed hyperlipidemia not requiring statin therapy  -     Comprehensive Metabolic Panel; Future; Expected date: 06/13/2024  -     Lipid Panel; Future; Expected date: 06/13/2024    4. History of herpes simplex infection of genitourinary system  -     acyclovir (ZOVIRAX) 400 MG tablet; Take 1 tablet (400 mg total) by mouth once daily.  Dispense: 90 tablet; Refill: 4    No other significant complaints or concerns were reported.    Today's visit involved the intricate management of episodic problem(s) and the ongoing care for the patient's serious or complex condition(s) listed above, reflecting the inherent complexity of providing longitudinal, comprehensive evaluation and management as the central hub for the patient's primary care services.  Vitals:    06/13/24 0927   BP: 112/80   BP Location: Left arm   Patient Position: Sitting   BP Method: Large (Manual)   Pulse: 93   Resp: 18   Temp: 96.3 °F (35.7 °C)  "  SpO2: 97%   Weight: 63 kg (138 lb 14.2 oz)   Height: 5' 5" (1.651 m)   Physical Exam  Vitals reviewed.   Constitutional:       General: He is not in acute distress.     Appearance: Normal appearance. He is not ill-appearing or diaphoretic.   Neck:      Vascular: No carotid bruit.   Cardiovascular:      Rate and Rhythm: Normal rate and regular rhythm.      Pulses: Normal pulses.   Pulmonary:      Effort: Pulmonary effort is normal.      Breath sounds: Normal breath sounds.   Skin:     General: Skin is warm and dry.   Neurological:      Mental Status: He is alert and oriented to person, place, and time. Mental status is at baseline.      Sensory: No sensory deficit.   Psychiatric:         Mood and Affect: Mood normal.         Behavior: Behavior normal.         Judgment: Judgment normal.       This note was generated with the assistance of ambient listening technology. Verbal consent was obtained by the patient and accompanying visitor(s) for the recording of patient appointment to facilitate this note. I attest to having reviewed and edited the generated note for accuracy, though some syntax or spelling errors may persist. Please contact the author of this note for any clarification.    Documentation entered by me for this encounter may have been done in part using speech-recognition technology. Although I have made an effort to ensure accuracy, "sound like" errors may exist and should be interpreted in context.   "

## 2024-07-31 ENCOUNTER — PATIENT MESSAGE (OUTPATIENT)
Dept: RESEARCH | Facility: HOSPITAL | Age: 69
End: 2024-07-31
Payer: MEDICARE

## 2024-08-14 DIAGNOSIS — A60.00 HERPES SIMPLEX INFECTION OF GENITOURINARY SYSTEM: Chronic | ICD-10-CM

## 2024-08-14 NOTE — TELEPHONE ENCOUNTER
Care Due:                  Date            Visit Type   Department     Provider  --------------------------------------------------------------------------------                                EP -                              PRIMARY      [facility]  Last Visit: 06-      CARE ([facility])   MEDICINE       Naman Marie  Next Visit: None Scheduled  None         None Found                                                            Last  Test          Frequency    Reason                     Performed    Due Date  --------------------------------------------------------------------------------    CBC.........  12 months..  acyclovir................  Not Found    Overdue    [employer] Embedded Care Due Messages. Reference number: [id number].   8/14/2024 1:24:36 PM CDT

## 2024-08-14 NOTE — TELEPHONE ENCOUNTER
Refill Routing Note   Medication(s) are not appropriate for processing by [facility] for the following reason(s):        Required labs outdated    [facility] action(s):  Defer     Requires labs : Yes      Medication Therapy Plan: Labs (CBC) overdue      Appointments  past 12m or future 3m with PCP    Date Provider   Last Visit   6/13/2024 ASHLEY Marie MD   Next Visit   Visit date not found ASHLEY Marie MD   ED visits in past 90 days: 0        Note composed:5:08 PM 08/14/2024

## 2024-08-24 RX ORDER — ACYCLOVIR 400 MG/1
400 TABLET ORAL DAILY
Qty: 90 TABLET | Refills: 2 | Status: SHIPPED | OUTPATIENT
Start: 2024-08-24

## 2024-08-24 NOTE — TELEPHONE ENCOUNTER
REFILL REQUEST APPROVED  Requested Prescriptions   Pending Prescriptions Disp Refills    acyclovir (ZOVIRAX) 400 MG tablet [Pharmacy Med Name: ACYCLOVIR 400MG TABLETS] 90 tablet 0     Sig: Take 1 tablet (400 mg total) by mouth once daily.

## 2025-01-23 ENCOUNTER — PATIENT MESSAGE (OUTPATIENT)
Dept: ADMINISTRATIVE | Facility: HOSPITAL | Age: 70
End: 2025-01-23
Payer: MEDICARE

## 2025-03-24 DIAGNOSIS — Z00.00 ENCOUNTER FOR MEDICARE ANNUAL WELLNESS EXAM: ICD-10-CM

## 2025-03-26 ENCOUNTER — PATIENT MESSAGE (OUTPATIENT)
Dept: INTERNAL MEDICINE | Facility: CLINIC | Age: 70
End: 2025-03-26
Payer: MEDICARE

## 2025-06-13 DIAGNOSIS — A60.00 HERPES SIMPLEX INFECTION OF GENITOURINARY SYSTEM: Chronic | ICD-10-CM

## 2025-06-13 NOTE — TELEPHONE ENCOUNTER
Care Due:                  Date            Visit Type   Department     Provider  --------------------------------------------------------------------------------                                EP -                              PRIMARY      [facility]  Last Visit: 06-      CARE ([facility])   MEDICINE       Naman Marie  Next Visit: None Scheduled  None         None Found                                                            Last  Test          Frequency    Reason                     Performed    Due Date  --------------------------------------------------------------------------------    Office Visit  15 months..  acyclovir................  06- 09-    CBC.........  12 months..  acyclovir................  Not Found    Overdue    Cr..........  12 months..  acyclovir................  06- 06-    [facility] Embedded Care Due Messages. Reference number: [id number].   6/13/2025 7:36:34 AM CDT

## 2025-06-13 NOTE — TELEPHONE ENCOUNTER
Refill Routing Note   Medication(s) are not appropriate for processing by [facility] for the following reason(s):        Required labs outdated    [facility] action(s):  Defer   Requires appointment : Yes   Requires labs : Yes             Appointments  past 12m or future 3m with PCP    Date Provider   Last Visit   6/13/2024 ASHLEY Marie MD   Next Visit   Visit date not found ASHLEY Marie MD   ED visits in past 90 days: 0        Note composed:12:10 PM 06/13/2025

## 2025-06-17 RX ORDER — ACYCLOVIR 400 MG/1
TABLET ORAL
Qty: 90 TABLET | Refills: 2 | Status: SHIPPED | OUTPATIENT
Start: 2025-06-17